# Patient Record
Sex: FEMALE | Race: WHITE | Employment: FULL TIME | URBAN - NONMETROPOLITAN AREA
[De-identification: names, ages, dates, MRNs, and addresses within clinical notes are randomized per-mention and may not be internally consistent; named-entity substitution may affect disease eponyms.]

---

## 2018-10-15 ENCOUNTER — OFFICE VISIT (OUTPATIENT)
Dept: FAMILY MEDICINE CLINIC | Age: 48
End: 2018-10-15
Payer: COMMERCIAL

## 2018-10-15 VITALS
HEIGHT: 69 IN | WEIGHT: 118.8 LBS | DIASTOLIC BLOOD PRESSURE: 52 MMHG | HEART RATE: 72 BPM | BODY MASS INDEX: 17.6 KG/M2 | SYSTOLIC BLOOD PRESSURE: 90 MMHG

## 2018-10-15 DIAGNOSIS — R63.4 WEIGHT LOSS: ICD-10-CM

## 2018-10-15 DIAGNOSIS — Z00.00 WELL ADULT HEALTH CHECK: ICD-10-CM

## 2018-10-15 DIAGNOSIS — Z12.4 SCREENING FOR CERVICAL CANCER: Primary | ICD-10-CM

## 2018-10-15 PROCEDURE — G8419 CALC BMI OUT NRM PARAM NOF/U: HCPCS | Performed by: FAMILY MEDICINE

## 2018-10-15 PROCEDURE — 1036F TOBACCO NON-USER: CPT | Performed by: FAMILY MEDICINE

## 2018-10-15 PROCEDURE — G8484 FLU IMMUNIZE NO ADMIN: HCPCS | Performed by: FAMILY MEDICINE

## 2018-10-15 PROCEDURE — 99213 OFFICE O/P EST LOW 20 MIN: CPT | Performed by: FAMILY MEDICINE

## 2018-10-15 PROCEDURE — G8427 DOCREV CUR MEDS BY ELIG CLIN: HCPCS | Performed by: FAMILY MEDICINE

## 2018-10-15 ASSESSMENT — PATIENT HEALTH QUESTIONNAIRE - PHQ9
2. FEELING DOWN, DEPRESSED OR HOPELESS: 0
SUM OF ALL RESPONSES TO PHQ QUESTIONS 1-9: 0
1. LITTLE INTEREST OR PLEASURE IN DOING THINGS: 0
SUM OF ALL RESPONSES TO PHQ9 QUESTIONS 1 & 2: 0
SUM OF ALL RESPONSES TO PHQ QUESTIONS 1-9: 0

## 2018-10-15 ASSESSMENT — ENCOUNTER SYMPTOMS
SHORTNESS OF BREATH: 0
GASTROINTESTINAL NEGATIVE: 1
ABDOMINAL PAIN: 0
BACK PAIN: 0
RESPIRATORY NEGATIVE: 1

## 2018-10-17 ENCOUNTER — TELEPHONE (OUTPATIENT)
Dept: FAMILY MEDICINE CLINIC | Age: 48
End: 2018-10-17

## 2018-10-17 ENCOUNTER — NURSE ONLY (OUTPATIENT)
Dept: FAMILY MEDICINE CLINIC | Age: 48
End: 2018-10-17
Payer: COMMERCIAL

## 2018-10-17 DIAGNOSIS — Z00.00 WELL ADULT HEALTH CHECK: ICD-10-CM

## 2018-10-17 DIAGNOSIS — R63.4 WEIGHT LOSS: ICD-10-CM

## 2018-10-17 DIAGNOSIS — E05.90 HYPERTHYROIDISM, SUBCLINICAL: Chronic | ICD-10-CM

## 2018-10-17 LAB
ALBUMIN SERPL-MCNC: 4.1 G/DL (ref 3.5–5.1)
ALP BLD-CCNC: 55 U/L (ref 38–126)
ALT SERPL-CCNC: 17 U/L (ref 11–66)
ANION GAP SERPL CALCULATED.3IONS-SCNC: 13 MEQ/L (ref 8–16)
AST SERPL-CCNC: 33 U/L (ref 5–40)
BASOPHILS # BLD: 1 %
BASOPHILS ABSOLUTE: 0 THOU/MM3 (ref 0–0.1)
BILIRUB SERPL-MCNC: 0.2 MG/DL (ref 0.3–1.2)
BUN BLDV-MCNC: 13 MG/DL (ref 7–22)
CALCIUM SERPL-MCNC: 9.5 MG/DL (ref 8.5–10.5)
CHLORIDE BLD-SCNC: 104 MEQ/L (ref 98–111)
CHOLESTEROL, TOTAL: 163 MG/DL (ref 100–199)
CO2: 26 MEQ/L (ref 23–33)
CREAT SERPL-MCNC: 0.8 MG/DL (ref 0.4–1.2)
CYTOLOGY THIN PREP PAP: NORMAL
EOSINOPHIL # BLD: 1.5 %
EOSINOPHILS ABSOLUTE: 0.1 THOU/MM3 (ref 0–0.4)
ERYTHROCYTE [DISTWIDTH] IN BLOOD BY AUTOMATED COUNT: 12.2 % (ref 11.5–14.5)
ERYTHROCYTE [DISTWIDTH] IN BLOOD BY AUTOMATED COUNT: 40.7 FL (ref 35–45)
GFR SERPL CREATININE-BSD FRML MDRD: 76 ML/MIN/1.73M2
GLUCOSE BLD-MCNC: 89 MG/DL (ref 70–108)
HCT VFR BLD CALC: 40.7 % (ref 37–47)
HDLC SERPL-MCNC: 44 MG/DL
HEMOGLOBIN: 13.3 GM/DL (ref 12–16)
IMMATURE GRANS (ABS): 0.01 THOU/MM3 (ref 0–0.07)
IMMATURE GRANULOCYTES: 0.3 %
LDL CHOLESTEROL CALCULATED: 103 MG/DL
LYMPHOCYTES # BLD: 32.9 %
LYMPHOCYTES ABSOLUTE: 1.3 THOU/MM3 (ref 1–4.8)
MCH RBC QN AUTO: 30 PG (ref 26–33)
MCHC RBC AUTO-ENTMCNC: 32.7 GM/DL (ref 32.2–35.5)
MCV RBC AUTO: 91.7 FL (ref 81–99)
MONOCYTES # BLD: 8.9 %
MONOCYTES ABSOLUTE: 0.4 THOU/MM3 (ref 0.4–1.3)
NUCLEATED RED BLOOD CELLS: 0 /100 WBC
PLATELET # BLD: 227 THOU/MM3 (ref 130–400)
PMV BLD AUTO: 9 FL (ref 9.4–12.4)
POTASSIUM SERPL-SCNC: 4.4 MEQ/L (ref 3.5–5.2)
RBC # BLD: 4.44 MILL/MM3 (ref 4.2–5.4)
SEG NEUTROPHILS: 55.4 %
SEGMENTED NEUTROPHILS ABSOLUTE COUNT: 2.2 THOU/MM3 (ref 1.8–7.7)
SODIUM BLD-SCNC: 143 MEQ/L (ref 135–145)
TOTAL PROTEIN: 7.4 G/DL (ref 6.1–8)
TRIGL SERPL-MCNC: 78 MG/DL (ref 0–199)
TSH SERPL DL<=0.05 MIU/L-ACNC: 0.89 UIU/ML (ref 0.4–4.2)
WBC # BLD: 4 THOU/MM3 (ref 4.8–10.8)

## 2018-10-17 PROCEDURE — 36415 COLL VENOUS BLD VENIPUNCTURE: CPT | Performed by: FAMILY MEDICINE

## 2018-11-30 ENCOUNTER — OFFICE VISIT (OUTPATIENT)
Dept: FAMILY MEDICINE CLINIC | Age: 48
End: 2018-11-30
Payer: COMMERCIAL

## 2018-11-30 VITALS
HEIGHT: 69 IN | HEART RATE: 80 BPM | TEMPERATURE: 98.3 F | WEIGHT: 116.6 LBS | DIASTOLIC BLOOD PRESSURE: 50 MMHG | SYSTOLIC BLOOD PRESSURE: 102 MMHG | BODY MASS INDEX: 17.27 KG/M2

## 2018-11-30 DIAGNOSIS — F41.1 GAD (GENERALIZED ANXIETY DISORDER): Primary | ICD-10-CM

## 2018-11-30 PROCEDURE — G8427 DOCREV CUR MEDS BY ELIG CLIN: HCPCS | Performed by: FAMILY MEDICINE

## 2018-11-30 PROCEDURE — 99213 OFFICE O/P EST LOW 20 MIN: CPT | Performed by: FAMILY MEDICINE

## 2018-11-30 PROCEDURE — G8484 FLU IMMUNIZE NO ADMIN: HCPCS | Performed by: FAMILY MEDICINE

## 2018-11-30 PROCEDURE — 1036F TOBACCO NON-USER: CPT | Performed by: FAMILY MEDICINE

## 2018-11-30 PROCEDURE — G8419 CALC BMI OUT NRM PARAM NOF/U: HCPCS | Performed by: FAMILY MEDICINE

## 2018-11-30 RX ORDER — ESCITALOPRAM OXALATE 10 MG/1
10 TABLET ORAL DAILY
Qty: 30 TABLET | Refills: 1 | Status: SHIPPED | OUTPATIENT
Start: 2018-11-30 | End: 2019-01-31 | Stop reason: SDUPTHER

## 2019-01-31 ENCOUNTER — OFFICE VISIT (OUTPATIENT)
Dept: FAMILY MEDICINE CLINIC | Age: 49
End: 2019-01-31
Payer: COMMERCIAL

## 2019-01-31 VITALS
HEIGHT: 69 IN | WEIGHT: 120.4 LBS | DIASTOLIC BLOOD PRESSURE: 60 MMHG | SYSTOLIC BLOOD PRESSURE: 116 MMHG | BODY MASS INDEX: 17.83 KG/M2 | HEART RATE: 84 BPM

## 2019-01-31 DIAGNOSIS — F41.1 GAD (GENERALIZED ANXIETY DISORDER): Primary | ICD-10-CM

## 2019-01-31 PROCEDURE — G8419 CALC BMI OUT NRM PARAM NOF/U: HCPCS | Performed by: FAMILY MEDICINE

## 2019-01-31 PROCEDURE — G8427 DOCREV CUR MEDS BY ELIG CLIN: HCPCS | Performed by: FAMILY MEDICINE

## 2019-01-31 PROCEDURE — 99213 OFFICE O/P EST LOW 20 MIN: CPT | Performed by: FAMILY MEDICINE

## 2019-01-31 PROCEDURE — G8484 FLU IMMUNIZE NO ADMIN: HCPCS | Performed by: FAMILY MEDICINE

## 2019-01-31 PROCEDURE — 1036F TOBACCO NON-USER: CPT | Performed by: FAMILY MEDICINE

## 2019-01-31 RX ORDER — ESCITALOPRAM OXALATE 20 MG/1
20 TABLET ORAL DAILY
Qty: 90 TABLET | Refills: 0 | Status: SHIPPED | OUTPATIENT
Start: 2019-01-31 | End: 2019-04-29 | Stop reason: SDUPTHER

## 2019-01-31 RX ORDER — ATENOLOL 25 MG/1
25 TABLET ORAL DAILY PRN
Qty: 30 TABLET | Refills: 2 | Status: SHIPPED | OUTPATIENT
Start: 2019-01-31 | End: 2019-01-31 | Stop reason: SDUPTHER

## 2019-01-31 RX ORDER — ESCITALOPRAM OXALATE 20 MG/1
20 TABLET ORAL DAILY
Qty: 90 TABLET | Refills: 0 | Status: SHIPPED | OUTPATIENT
Start: 2019-01-31 | End: 2019-01-31 | Stop reason: SDUPTHER

## 2019-01-31 RX ORDER — ATENOLOL 25 MG/1
25 TABLET ORAL DAILY PRN
Qty: 30 TABLET | Refills: 2 | Status: SHIPPED | OUTPATIENT
Start: 2019-01-31 | End: 2021-05-05 | Stop reason: SDUPTHER

## 2019-01-31 ASSESSMENT — PATIENT HEALTH QUESTIONNAIRE - PHQ9
SUM OF ALL RESPONSES TO PHQ QUESTIONS 1-9: 0
SUM OF ALL RESPONSES TO PHQ9 QUESTIONS 1 & 2: 0
SUM OF ALL RESPONSES TO PHQ QUESTIONS 1-9: 0
1. LITTLE INTEREST OR PLEASURE IN DOING THINGS: 0
2. FEELING DOWN, DEPRESSED OR HOPELESS: 0

## 2019-02-17 ENCOUNTER — NURSE TRIAGE (OUTPATIENT)
Dept: ADMINISTRATIVE | Age: 49
End: 2019-02-17

## 2019-04-29 ENCOUNTER — OFFICE VISIT (OUTPATIENT)
Dept: FAMILY MEDICINE CLINIC | Age: 49
End: 2019-04-29
Payer: COMMERCIAL

## 2019-04-29 VITALS
BODY MASS INDEX: 17.83 KG/M2 | DIASTOLIC BLOOD PRESSURE: 62 MMHG | SYSTOLIC BLOOD PRESSURE: 100 MMHG | HEIGHT: 69 IN | WEIGHT: 120.4 LBS | HEART RATE: 62 BPM

## 2019-04-29 DIAGNOSIS — E05.90 HYPERTHYROIDISM, SUBCLINICAL: ICD-10-CM

## 2019-04-29 DIAGNOSIS — F41.1 GAD (GENERALIZED ANXIETY DISORDER): Primary | ICD-10-CM

## 2019-04-29 PROCEDURE — 1036F TOBACCO NON-USER: CPT | Performed by: FAMILY MEDICINE

## 2019-04-29 PROCEDURE — 99213 OFFICE O/P EST LOW 20 MIN: CPT | Performed by: FAMILY MEDICINE

## 2019-04-29 PROCEDURE — G8419 CALC BMI OUT NRM PARAM NOF/U: HCPCS | Performed by: FAMILY MEDICINE

## 2019-04-29 PROCEDURE — 3017F COLORECTAL CA SCREEN DOC REV: CPT | Performed by: FAMILY MEDICINE

## 2019-04-29 PROCEDURE — G8427 DOCREV CUR MEDS BY ELIG CLIN: HCPCS | Performed by: FAMILY MEDICINE

## 2019-04-29 RX ORDER — ESCITALOPRAM OXALATE 20 MG/1
20 TABLET ORAL DAILY
Qty: 90 TABLET | Refills: 0 | Status: SHIPPED | OUTPATIENT
Start: 2019-04-29 | End: 2019-12-13

## 2019-04-29 ASSESSMENT — ENCOUNTER SYMPTOMS
ABDOMINAL PAIN: 0
RESPIRATORY NEGATIVE: 1
SHORTNESS OF BREATH: 0
GASTROINTESTINAL NEGATIVE: 1

## 2019-04-30 NOTE — PROGRESS NOTES
[facility] PROFESSIONAL [facility]  [address] [id number]  Dept: [phone]  Dept Fax: [phone]: [phone]    Visit Date: 4/29/2019    Geoff Gary is a 47 y. o.female who presents today for:   Chief Complaint   Patient presents with    3 Month Follow-Up     follow up on lexapro-makes tired    Hyperthyroidism         HPI:      She has settled on Lexapro for her anxiety and worry now for 5 months. Happy with the results. We discussed genetics, etc.   Daughter now having issues. She does report issues of fatigue and even afternoon 1/2 hour naps. Current Outpatient Medications   Medication Sig Dispense Refill    escitalopram (LEXAPRO) 20 MG tablet Take 1 tablet by mouth daily 90 tablet 0    atenolol (TENORMIN) 25 MG tablet Take 1 tablet by mouth daily as needed (heart) 30 tablet 2     No current facility-administered medications for this visit. The patient is allergic to neomycin. Subjective:      Review of Systems   Constitutional: Negative. Negative for activity change, appetite change and unexpected weight change. HENT: Negative. Respiratory: Negative. Negative for shortness of breath. Cardiovascular: Negative. Negative for chest pain. Gastrointestinal: Negative. Negative for abdominal pain. Genitourinary: Negative. Musculoskeletal: Negative. Skin: Negative. Neurological: Negative. Hematological: Negative. Psychiatric/Behavioral: Negative for dysphoric mood. The patient is nervous/anxious. Objective:     /62 (Site: Right Upper Arm, Position: Sitting, Cuff Size: Medium Adult)   Pulse 62   Ht 5' 9\" (1.753 m)   Wt 120 lb 6.4 oz (54.6 kg)   BMI 17.78 kg/m²     Physical Exam   Constitutional: She is oriented to person, place, and time. She appears well-developed and well-nourished. Neck: Normal range of motion. Neck supple. No thyromegaly present.    Cardiovascular: Normal rate, regular rhythm and normal heart sounds. Exam reveals no gallop and no friction rub. No murmur heard. Pulmonary/Chest: Effort normal and breath sounds normal.   Abdominal: Soft. She exhibits no mass. There is no splenomegaly or hepatomegaly. There is no tenderness. Musculoskeletal: She exhibits no edema or tenderness. Lymphadenopathy:     She has no cervical adenopathy. Neurological: She is alert and oriented to person, place, and time. Ambulatory. No tremors. Skin: Skin is warm and dry. No rash noted. Psychiatric: She has a normal mood and affect. Vitals reviewed. Assessment:       Diagnosis Orders   1. CHRISTINE (generalized anxiety disorder)     2. Hyperthyroidism, subclinical         Plan:    I want to try cutting Lexapro to 10 mg but will RX 20 mg tabs in case she wants to go back. Return in about 4 months (around 8/29/2019). Orders Placed:  No orders of the defined types were placed in this encounter.     Medications Prescribed:  Orders Placed This Encounter   Medications    escitalopram (LEXAPRO) 20 MG tablet     Sig: Take 1 tablet by mouth daily     Dispense:  90 tablet     Refill:  0         Electronically signed by Jean Paul Guaman 4/29/2019 at 8:12 PM

## 2019-05-11 LAB
ALBUMIN SERPL-MCNC: 4.6 G/DL
ALP BLD-CCNC: 57 U/L
ALT SERPL-CCNC: 11 U/L
ANION GAP SERPL CALCULATED.3IONS-SCNC: NORMAL MMOL/L
AST SERPL-CCNC: 20 U/L
BASOPHILS ABSOLUTE: NORMAL /ΜL
BASOPHILS RELATIVE PERCENT: 0.6 %
BILIRUB SERPL-MCNC: 0.7 MG/DL (ref 0.1–1.4)
BUN BLDV-MCNC: 16 MG/DL
CALCIUM SERPL-MCNC: 9.8 MG/DL
CHLORIDE BLD-SCNC: 103 MMOL/L
CHOLESTEROL, TOTAL: 166 MG/DL
CHOLESTEROL/HDL RATIO: NORMAL
CO2: 30 MMOL/L
CREAT SERPL-MCNC: 0.7 MG/DL
EOSINOPHILS ABSOLUTE: 0.1 /ΜL
EOSINOPHILS RELATIVE PERCENT: 2 %
GFR CALCULATED: 89
GLUCOSE BLD-MCNC: 69 MG/DL
HCT VFR BLD CALC: 38.2 % (ref 36–46)
HDLC SERPL-MCNC: 54 MG/DL (ref 35–70)
HEMOGLOBIN: 12.6 G/DL (ref 12–16)
LDL CHOLESTEROL CALCULATED: 93 MG/DL (ref 0–160)
LYMPHOCYTES ABSOLUTE: 1.5 /ΜL
LYMPHOCYTES RELATIVE PERCENT: 29.6 %
MCH RBC QN AUTO: 30.2 PG
MCHC RBC AUTO-ENTMCNC: 33.1 G/DL
MCV RBC AUTO: 91.3 FL
MONOCYTES ABSOLUTE: 0.3 /ΜL
MONOCYTES RELATIVE PERCENT: 6.9 %
NEUTROPHILS ABSOLUTE: 3 /ΜL
NEUTROPHILS RELATIVE PERCENT: 60.9 %
PDW BLD-RTO: 13.8 %
PHOSPHORUS: 2.8 MG/DL
PLATELET # BLD: 240 K/ΜL
PMV BLD AUTO: 7.1 FL
POTASSIUM SERPL-SCNC: 4.7 MMOL/L
RBC # BLD: 4.18 10^6/ΜL
SODIUM BLD-SCNC: 139 MMOL/L
TOTAL PROTEIN: 7
TRIGL SERPL-MCNC: 95 MG/DL
TSH SERPL DL<=0.05 MIU/L-ACNC: 0.41 UIU/ML
VLDLC SERPL CALC-MCNC: 19 MG/DL
WBC # BLD: 5 10^3/ML

## 2019-08-26 ENCOUNTER — OFFICE VISIT (OUTPATIENT)
Dept: FAMILY MEDICINE CLINIC | Age: 49
End: 2019-08-26
Payer: COMMERCIAL

## 2019-08-26 VITALS
HEART RATE: 78 BPM | SYSTOLIC BLOOD PRESSURE: 110 MMHG | HEIGHT: 69 IN | DIASTOLIC BLOOD PRESSURE: 60 MMHG | BODY MASS INDEX: 18.19 KG/M2 | WEIGHT: 122.8 LBS

## 2019-08-26 DIAGNOSIS — F41.1 GAD (GENERALIZED ANXIETY DISORDER): Primary | ICD-10-CM

## 2019-08-26 PROCEDURE — 1036F TOBACCO NON-USER: CPT | Performed by: FAMILY MEDICINE

## 2019-08-26 PROCEDURE — G8427 DOCREV CUR MEDS BY ELIG CLIN: HCPCS | Performed by: FAMILY MEDICINE

## 2019-08-26 PROCEDURE — 3017F COLORECTAL CA SCREEN DOC REV: CPT | Performed by: FAMILY MEDICINE

## 2019-08-26 PROCEDURE — G8419 CALC BMI OUT NRM PARAM NOF/U: HCPCS | Performed by: FAMILY MEDICINE

## 2019-08-26 PROCEDURE — 99213 OFFICE O/P EST LOW 20 MIN: CPT | Performed by: FAMILY MEDICINE

## 2019-12-13 ENCOUNTER — OFFICE VISIT (OUTPATIENT)
Dept: FAMILY MEDICINE CLINIC | Age: 49
End: 2019-12-13
Payer: COMMERCIAL

## 2019-12-13 VITALS
OXYGEN SATURATION: 99 % | HEART RATE: 64 BPM | DIASTOLIC BLOOD PRESSURE: 60 MMHG | HEIGHT: 69 IN | BODY MASS INDEX: 17.33 KG/M2 | WEIGHT: 117 LBS | SYSTOLIC BLOOD PRESSURE: 104 MMHG

## 2019-12-13 DIAGNOSIS — Z12.11 SPECIAL SCREENING FOR MALIGNANT NEOPLASMS, COLON: ICD-10-CM

## 2019-12-13 DIAGNOSIS — F41.1 GAD (GENERALIZED ANXIETY DISORDER): Primary | ICD-10-CM

## 2019-12-13 DIAGNOSIS — E05.90 HYPERTHYROIDISM, SUBCLINICAL: ICD-10-CM

## 2019-12-13 PROCEDURE — G8419 CALC BMI OUT NRM PARAM NOF/U: HCPCS | Performed by: FAMILY MEDICINE

## 2019-12-13 PROCEDURE — G8484 FLU IMMUNIZE NO ADMIN: HCPCS | Performed by: FAMILY MEDICINE

## 2019-12-13 PROCEDURE — 99213 OFFICE O/P EST LOW 20 MIN: CPT | Performed by: FAMILY MEDICINE

## 2019-12-13 PROCEDURE — G8427 DOCREV CUR MEDS BY ELIG CLIN: HCPCS | Performed by: FAMILY MEDICINE

## 2019-12-13 PROCEDURE — 3017F COLORECTAL CA SCREEN DOC REV: CPT | Performed by: FAMILY MEDICINE

## 2019-12-13 PROCEDURE — 1036F TOBACCO NON-USER: CPT | Performed by: FAMILY MEDICINE

## 2019-12-13 RX ORDER — ESCITALOPRAM OXALATE 10 MG/1
10 TABLET ORAL DAILY
Qty: 90 TABLET | Refills: 3 | Status: SHIPPED | OUTPATIENT
Start: 2019-12-13 | End: 2020-11-04 | Stop reason: SDUPTHER

## 2019-12-13 ASSESSMENT — ENCOUNTER SYMPTOMS
GASTROINTESTINAL NEGATIVE: 1
RESPIRATORY NEGATIVE: 1
SHORTNESS OF BREATH: 0
ABDOMINAL PAIN: 0

## 2020-02-07 ENCOUNTER — TELEPHONE (OUTPATIENT)
Dept: FAMILY MEDICINE CLINIC | Age: 50
End: 2020-02-07

## 2020-02-07 LAB
CONTROL: PRESENT
HEMOCCULT STL QL: NEGATIVE

## 2020-02-07 PROCEDURE — 82274 ASSAY TEST FOR BLOOD FECAL: CPT | Performed by: FAMILY MEDICINE

## 2020-03-18 ENCOUNTER — TELEPHONE (OUTPATIENT)
Dept: FAMILY MEDICINE CLINIC | Age: 50
End: 2020-03-18

## 2020-03-18 RX ORDER — VALACYCLOVIR HYDROCHLORIDE 1 G/1
1000 TABLET, FILM COATED ORAL 3 TIMES DAILY
Qty: 21 TABLET | Refills: 0 | Status: SHIPPED | OUTPATIENT
Start: 2020-03-18 | End: 2020-03-25

## 2020-08-05 ENCOUNTER — OFFICE VISIT (OUTPATIENT)
Dept: FAMILY MEDICINE CLINIC | Age: 50
End: 2020-08-05
Payer: COMMERCIAL

## 2020-08-05 VITALS
SYSTOLIC BLOOD PRESSURE: 118 MMHG | HEART RATE: 72 BPM | WEIGHT: 121.4 LBS | BODY MASS INDEX: 17.98 KG/M2 | HEIGHT: 69 IN | TEMPERATURE: 97.3 F | DIASTOLIC BLOOD PRESSURE: 70 MMHG

## 2020-08-05 PROBLEM — R41.840 CONCENTRATION DEFICIT: Status: ACTIVE | Noted: 2020-08-05

## 2020-08-05 PROBLEM — G47.8 UNREFRESHED BY SLEEP: Status: ACTIVE | Noted: 2020-08-05

## 2020-08-05 PROBLEM — R53.83 OTHER FATIGUE: Status: ACTIVE | Noted: 2020-08-05

## 2020-08-05 PROCEDURE — G8427 DOCREV CUR MEDS BY ELIG CLIN: HCPCS | Performed by: FAMILY MEDICINE

## 2020-08-05 PROCEDURE — G8419 CALC BMI OUT NRM PARAM NOF/U: HCPCS | Performed by: FAMILY MEDICINE

## 2020-08-05 PROCEDURE — 1036F TOBACCO NON-USER: CPT | Performed by: FAMILY MEDICINE

## 2020-08-05 PROCEDURE — 3017F COLORECTAL CA SCREEN DOC REV: CPT | Performed by: FAMILY MEDICINE

## 2020-08-05 PROCEDURE — 99214 OFFICE O/P EST MOD 30 MIN: CPT | Performed by: FAMILY MEDICINE

## 2020-08-05 ASSESSMENT — ENCOUNTER SYMPTOMS
VOMITING: 0
DIARRHEA: 0
ABDOMINAL PAIN: 1
COUGH: 0
SINUS PRESSURE: 0
ABDOMINAL DISTENTION: 1
CONSTIPATION: 1
NAUSEA: 0
SHORTNESS OF BREATH: 0
WHEEZING: 0

## 2020-08-05 ASSESSMENT — PATIENT HEALTH QUESTIONNAIRE - PHQ9
SUM OF ALL RESPONSES TO PHQ QUESTIONS 1-9: 0
SUM OF ALL RESPONSES TO PHQ QUESTIONS 1-9: 0
SUM OF ALL RESPONSES TO PHQ9 QUESTIONS 1 & 2: 0
2. FEELING DOWN, DEPRESSED OR HOPELESS: 0
1. LITTLE INTEREST OR PLEASURE IN DOING THINGS: 0

## 2020-08-05 NOTE — PROGRESS NOTES
[address] [place] [address] [address], [address], [place]  Phone:  [phone]  TDT:[phone]       Name: Joel Gomes  : 1970    Chief Complaint   Patient presents with    Fatigue     feels like she in a fog all day       HPI:     Joel Gomes is a 45 y.o. female who presents today for     HPI    H/o thyroid disease and depression   Thyroid has not needed further work up or treatment. Fog like feeling for a couple years then got better and now it is much worse. Will just stare at time in the middle of needing to concentrate. Can't remember what she is going to a room for. Bloating ongoing for months. Lack of energy up and down  [employer], many years. 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 Mother of 3, one , one senior, one graduated.  with Lymphoma, traveling to and from [facility] for treatment. Not too other caring issues    Diet -- not meat eater. Some beans. Some dairy. Nuts okay. 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 Nothing steady. Sleep --  7-8 hrs, not refreshed, wakes up in a couple of hours. Tolerating medications well   Atenolol taken as needed -- not a problem recently. Last time taken in winter. lexapro for anxiety and emotional, Dec. 2018. Feels more calm overall. Chronic worrier. Last period 2019 monthly. No late periods. No hot flashes. No night sweats.      Lab Results   Component Value Date    WBC 5.0 2019    HGB 12.6 2019    HCT 38.2 2019    MCV 91.3 2019     2019     Lab Results   Component Value Date    TSH 0.415 2019     No results found for: [id number]  Lab Results   Component Value Date     2019    K 4.7 2019     2019    CO2 30 2019    BUN 16 2019    CREATININE 0.7 2019    GLUCOSE 69 2019    CALCIUM 9.8 2019        Current Outpatient Medications:     escitalopram (LEXAPRO)

## 2020-08-05 NOTE — PATIENT INSTRUCTIONS
For bloating,  May add probiotic -- Activia or Chobani low sugar option. Or Kefir 1/2 cup + flax seed meal 1 Tbsp + 1/2 cup fruit (optional) -- daily for 1 week. If not getting good enough results with digestion, then double up on Kefir and flax seed meal.     Once you have obtained a couple of weeks of healthier digestive and intestinal results   then use 3-4 times a week. If having too many bowel movements a day, then may decrease amount or frequency further. After doing above for 4-6 weeks, and still experiencing bloating  -- may add collagen , organic, grass-fed, bovine   -- try for another 4-6 weeks and if not improvement may stop if desired. Luh Flores your records show you are due for the Shingrix vaccination. This is a vaccine to protect you from getting shingles. It is 90% plus protective. This is a great improvement over the previous vaccine. Many insurance plans are covering this vaccine. We do recommend you consider being vaccinated with this product. Shingrix is available through our office or your local pharmacy depending on your insurance coverage. We would like you to contact your insurance company to:   1. Check Coverage   2. Find out where you should receive this; our office or your local pharmacy. Thank you!

## 2020-08-11 ENCOUNTER — HOSPITAL ENCOUNTER (OUTPATIENT)
Age: 50
Discharge: HOME | End: 2020-08-11
Payer: COMMERCIAL

## 2020-08-11 DIAGNOSIS — R53.83 OTHER FATIGUE: ICD-10-CM

## 2020-08-11 DIAGNOSIS — E05.90 HYPERTHYROIDISM, SUBCLINICAL: Chronic | ICD-10-CM

## 2020-08-11 DIAGNOSIS — G47.8 UNREFRESHED BY SLEEP: ICD-10-CM

## 2020-08-11 DIAGNOSIS — R41.840 CONCENTRATION DEFICIT: ICD-10-CM

## 2020-08-11 LAB
ALBUMIN SERPL-MCNC: 4.3 G/DL (ref 3.5–5.1)
ALP BLD-CCNC: 57 U/L (ref 38–126)
ALT SERPL-CCNC: 10 U/L (ref 11–66)
ANION GAP SERPL CALCULATED.3IONS-SCNC: 10 MEQ/L (ref 8–16)
AST SERPL-CCNC: 23 U/L (ref 5–40)
BASOPHILS # BLD: 0.8 %
BASOPHILS ABSOLUTE: 0 THOU/MM3 (ref 0–0.1)
BILIRUB SERPL-MCNC: 0.6 MG/DL (ref 0.3–1.2)
BUN BLDV-MCNC: 8 MG/DL (ref 7–22)
CALCIUM SERPL-MCNC: 9.2 MG/DL (ref 8.5–10.5)
CHLORIDE BLD-SCNC: 104 MEQ/L (ref 98–111)
CHOLESTEROL, TOTAL: 188 MG/DL (ref 100–199)
CO2: 26 MEQ/L (ref 23–33)
CREAT SERPL-MCNC: 0.7 MG/DL (ref 0.4–1.2)
EOSINOPHIL # BLD: 2.1 %
EOSINOPHILS ABSOLUTE: 0.1 THOU/MM3 (ref 0–0.4)
ERYTHROCYTE [DISTWIDTH] IN BLOOD BY AUTOMATED COUNT: 12.6 % (ref 11.5–14.5)
ERYTHROCYTE [DISTWIDTH] IN BLOOD BY AUTOMATED COUNT: 44.9 FL (ref 35–45)
GFR SERPL CREATININE-BSD FRML MDRD: 88 ML/MIN/1.73M2
GLUCOSE BLD-MCNC: 85 MG/DL (ref 70–108)
HCT VFR BLD CALC: 38.5 % (ref 37–47)
HDLC SERPL-MCNC: 54 MG/DL
HEMOGLOBIN: 12.2 GM/DL (ref 12–16)
IMMATURE GRANS (ABS): 0.02 THOU/MM3 (ref 0–0.07)
IMMATURE GRANULOCYTES: 0.4 %
LDL CHOLESTEROL CALCULATED: 108 MG/DL
LYMPHOCYTES # BLD: 31.9 %
LYMPHOCYTES ABSOLUTE: 1.5 THOU/MM3 (ref 1–4.8)
MCH RBC QN AUTO: 30.5 PG (ref 26–33)
MCHC RBC AUTO-ENTMCNC: 31.7 GM/DL (ref 32.2–35.5)
MCV RBC AUTO: 96.3 FL (ref 81–99)
MONOCYTES # BLD: 8.2 %
MONOCYTES ABSOLUTE: 0.4 THOU/MM3 (ref 0.4–1.3)
NUCLEATED RED BLOOD CELLS: 0 /100 WBC
PLATELET # BLD: 206 THOU/MM3 (ref 130–400)
PMV BLD AUTO: 9.4 FL (ref 9.4–12.4)
POTASSIUM SERPL-SCNC: 4.3 MEQ/L (ref 3.5–5.2)
RBC # BLD: 4 MILL/MM3 (ref 4.2–5.4)
SEG NEUTROPHILS: 56.6 %
SEGMENTED NEUTROPHILS ABSOLUTE COUNT: 2.7 THOU/MM3 (ref 1.8–7.7)
SODIUM BLD-SCNC: 140 MEQ/L (ref 135–145)
TOTAL PROTEIN: 7.3 G/DL (ref 6.1–8)
TRIGL SERPL-MCNC: 132 MG/DL (ref 0–199)
TSH SERPL DL<=0.05 MIU/L-ACNC: 0.76 UIU/ML (ref 0.4–4.2)
VITAMIN B-12: 481 PG/ML (ref 211–911)
VITAMIN D 25-HYDROXY: 42 NG/ML (ref 30–100)
WBC # BLD: 4.8 THOU/MM3 (ref 4.8–10.8)

## 2020-08-11 PROCEDURE — 80061 LIPID PANEL: CPT

## 2020-08-11 PROCEDURE — 36415 COLL VENOUS BLD VENIPUNCTURE: CPT

## 2020-08-11 PROCEDURE — 82306 VITAMIN D 25 HYDROXY: CPT

## 2020-08-11 PROCEDURE — 82607 VITAMIN B-12: CPT

## 2020-08-11 PROCEDURE — 85025 COMPLETE CBC W/AUTO DIFF WBC: CPT

## 2020-08-11 PROCEDURE — 80053 COMPREHEN METABOLIC PANEL: CPT

## 2020-08-11 PROCEDURE — 84443 ASSAY THYROID STIM HORMONE: CPT

## 2020-08-14 ENCOUNTER — HOSPITAL ENCOUNTER (OUTPATIENT)
Dept: MAMMOGRAPHY | Age: 50
Discharge: HOME | End: 2020-08-14
Payer: COMMERCIAL

## 2020-08-14 PROCEDURE — 77063 BREAST TOMOSYNTHESIS BI: CPT

## 2020-08-18 ENCOUNTER — TELEPHONE (OUTPATIENT)
Dept: FAMILY MEDICINE CLINIC | Age: 50
End: 2020-08-18

## 2020-08-18 NOTE — TELEPHONE ENCOUNTER
----- Message from Faye Gomez MD sent at 8/17/2020  9:08 AM EDT -----  Please let patient know that the radiologist reading her mammogram is recommend one more view as there was a spot that was not pictured well. I have placed these orders but I am not sure if correct ones. please check with radiology to see if correct. I tried to reach them but they were busy. Thank you.

## 2020-08-21 ENCOUNTER — HOSPITAL ENCOUNTER (OUTPATIENT)
Dept: WOMENS IMAGING | Age: 50
Discharge: HOME | End: 2020-08-21
Payer: COMMERCIAL

## 2020-08-21 ENCOUNTER — HOSPITAL ENCOUNTER (OUTPATIENT)
Dept: WOMENS IMAGING | Age: 50
End: 2020-08-21
Payer: COMMERCIAL

## 2020-08-21 PROCEDURE — G0279 TOMOSYNTHESIS, MAMMO: HCPCS

## 2020-10-14 ENCOUNTER — OFFICE VISIT (OUTPATIENT)
Dept: FAMILY MEDICINE CLINIC | Age: 50
End: 2020-10-14
Payer: COMMERCIAL

## 2020-10-14 ENCOUNTER — HOSPITAL ENCOUNTER (OUTPATIENT)
Age: 50
Discharge: HOME | End: 2020-10-14
Payer: COMMERCIAL

## 2020-10-14 ENCOUNTER — HOSPITAL ENCOUNTER (OUTPATIENT)
Dept: GENERAL RADIOLOGY | Age: 50
Discharge: HOME | End: 2020-10-14
Payer: COMMERCIAL

## 2020-10-14 VITALS
SYSTOLIC BLOOD PRESSURE: 114 MMHG | HEART RATE: 72 BPM | DIASTOLIC BLOOD PRESSURE: 70 MMHG | HEIGHT: 69 IN | BODY MASS INDEX: 16.94 KG/M2 | TEMPERATURE: 97 F | WEIGHT: 114.4 LBS

## 2020-10-14 PROCEDURE — G8419 CALC BMI OUT NRM PARAM NOF/U: HCPCS | Performed by: FAMILY MEDICINE

## 2020-10-14 PROCEDURE — 1036F TOBACCO NON-USER: CPT | Performed by: FAMILY MEDICINE

## 2020-10-14 PROCEDURE — G8427 DOCREV CUR MEDS BY ELIG CLIN: HCPCS | Performed by: FAMILY MEDICINE

## 2020-10-14 PROCEDURE — 99213 OFFICE O/P EST LOW 20 MIN: CPT | Performed by: FAMILY MEDICINE

## 2020-10-14 PROCEDURE — G8484 FLU IMMUNIZE NO ADMIN: HCPCS | Performed by: FAMILY MEDICINE

## 2020-10-14 PROCEDURE — 73630 X-RAY EXAM OF FOOT: CPT

## 2020-10-14 PROCEDURE — 3017F COLORECTAL CA SCREEN DOC REV: CPT | Performed by: FAMILY MEDICINE

## 2020-10-14 ASSESSMENT — ENCOUNTER SYMPTOMS
BACK PAIN: 1
VOMITING: 0
NAUSEA: 0

## 2020-10-14 NOTE — PATIENT INSTRUCTIONS
Patient Education        Sesamoid Injury: Care Instructions  Your Care Instructions  Under the main joint of your big toe are two pea-sized bones called sesamoids (say \"WQS-vut-oiweo\"). They work with other bones, muscles, and tendons to help your toe and foot work correctly. Sometimes the force from walking, running, or jumping causes these bones to break. Or the tendons around these bones may get irritated and inflamed over time. When the tendons get inflamed, it's called sesamoiditis (say \"WYR-mfw-msl-DY-tis\"). A sesamoid injury is usually treated with proper shoes or with shoe inserts. Some people need to have their toe joint taped, or they need to wear a walking cast for a few weeks. The tape or cast keeps the joint from moving while it heals. Your doctor may give you a shot of steroid medicine in the foot to relieve pain and inflammation. Some people will need to wear a hard cast that is not removable. They'll need to stay off of their feet for a while. Some people need surgery to repair the bone. Follow-up care is a key part of your treatment and safety. Be sure to make and go to all appointments, and call your doctor if you are having problems. It's also a good idea to know your test results and keep a list of the medicines you take. How can you care for yourself at home? · Rest and protect your foot. Take a break from any activity that may cause pain. · Wear shoes that have low or flat heels and good arch supports. Don't wear tight, narrow, or high-heeled shoes. · Use moleskin or another type of cushion to pad the area. · If you have crutches, a cast, or special tape, follow your doctor's instructions for care and use. · Put ice or a cold pack around your toe for 10 to 20 minutes at a time as needed. Put a thin cloth between the ice and your skin. · Prop up your foot on a pillow when you ice your toe or anytime you sit or lie down. Try to keep it above the level of your heart.  This will help reduce swelling. · Ask your doctor if you can take an over-the-counter pain medicine, such as acetaminophen (Tylenol), ibuprofen (Advil, Motrin), or naproxen (Aleve). Be safe with medicines. Read and follow all instructions on the label. · Return to your usual activity slowly, as you feel better. When should you call for help? Watch closely for changes in your health, and be sure to contact your doctor if:    · You do not get better as expected. Where can you learn more? Go to https://[employer]appy shuttleeb.[employer]. org and sign in to your [employer] account. Enter J342 in the [employer] box to learn more about \"Sesamoid Injury: Care Instructions. \"     If you do not have an account, please click on the \"Sign Up Now\" link. Current as of: March 2, 2020               Content Version: 12.6  © 9450-9944 [employer]. Care instructions adapted under license by [facility] ([facility]). If you have questions about a medical condition or this instruction, always ask your healthcare professional. [address] any warranty or liability for your use of this information.

## 2020-10-14 NOTE — PROGRESS NOTES
10/14/2020     Israel Salmon (:  1970) is a 45 y.o. female, here for evaluation of the following medical concerns:    HPI  Foot pain - Patient complains of a cracking sensation that happens in her bilateral 1st MPJ, left is worse than right. Cracking noise has been going on for couple years, pain for the past year. Pain worsened in past week and a half. Patient has been exercising more in that time, but no traumatic history. Pain is throbbing/achy. Patient uses a gel metatarsal pad, unsure if it helps or not. No numbness tingling or burning. No other foot problems. Review of Systems   Constitutional: Negative for chills and fever. Cardiovascular: Negative for chest pain and leg swelling. Gastrointestinal: Negative for nausea and vomiting. Musculoskeletal: Positive for back pain (chronic - ongoing for long time). Negative for joint swelling. Prior to Visit Medications    Medication Sig Taking? Authorizing Provider   escitalopram (LEXAPRO) 10 MG tablet Take 1 tablet by mouth daily Yes Peace Machuca MD   atenolol (TENORMIN) 25 MG tablet Take 1 tablet by mouth daily as needed (heart) Yes Lilly Major MD        Social History     Tobacco Use    Smoking status: Never Smoker    Smokeless tobacco: Never Used   Substance Use Topics    Alcohol use: Yes        Vitals:    10/14/20 1505   BP: 114/70   Site: Left Upper Arm   Position: Sitting   Cuff Size: Medium Adult   Pulse: 72   Temp: 97 °F (36.1 °C)   TempSrc: Temporal   Weight: 114 lb 6.4 oz (51.9 kg)   Height: 5' 9\" (1.753 m)     Estimated body mass index is 16.89 kg/m² as calculated from the following:    Height as of this encounter: 5' 9\" (1.753 m). Weight as of this encounter: 114 lb 6.4 oz (51.9 kg). Physical Exam  Constitutional:       Appearance: Normal appearance. Cardiovascular:      Rate and Rhythm: Normal rate and regular rhythm.       Pulses:           Dorsalis pedis pulses are 2+ on the right side and 2+ on the left side.        Posterior tibial pulses are 2+ on the right side and 2+ on the left side. Pulmonary:      Effort: Pulmonary effort is normal.      Breath sounds: Normal breath sounds. Feet:      Comments: Left 1st MPJ pain with end dorsiflexory ROM, pain on palpation of left tibial sesamoid > fibular sesamoid. Normal and painless ROM for ankle, STJ, 1st ray b/l. Dermatologic: Skin quality is adequate, no open lesions, turgor normal bilaterally. Skin:     General: Skin is warm and dry. Capillary Refill: Capillary refill takes 2 to 3 seconds. Neurological:      Mental Status: She is alert. Psychiatric:         Mood and Affect: Mood normal.         Behavior: Behavior normal.         ASSESSMENT/PLAN:  1. Great toe pain, left  - Patient seen and evaluated  - Discussed that patient likely has either arthritis in left 1st MPJ or sesamoiditis  - Good supportive and rigid shoes recommended to reduce stress on 1st MPJ  - May use carbon fiber insert to make shoes more rigid if shoes are still too flexible  - Avoid shoes without support (heels, flats), only wear them at times when you will be mainly sitting or not walking  - Possibility for injection to 1st MPJ in future  - Last option of treatment is surgical correction, patient is not a surgical candidate at this point in time. - XR FOOT LEFT (MIN 3 VIEWS); Future      Return if symptoms worsen or fail to improve. An electronic signature was used to authenticate this note.     --Maurilio Mosher DPM on 10/14/2020 at 3:07 PM

## 2020-11-04 ENCOUNTER — OFFICE VISIT (OUTPATIENT)
Dept: FAMILY MEDICINE CLINIC | Age: 50
End: 2020-11-04
Payer: COMMERCIAL

## 2020-11-04 VITALS
HEIGHT: 69 IN | HEART RATE: 68 BPM | DIASTOLIC BLOOD PRESSURE: 68 MMHG | WEIGHT: 115.8 LBS | SYSTOLIC BLOOD PRESSURE: 110 MMHG | TEMPERATURE: 97.9 F | BODY MASS INDEX: 17.15 KG/M2

## 2020-11-04 PROCEDURE — 3017F COLORECTAL CA SCREEN DOC REV: CPT | Performed by: FAMILY MEDICINE

## 2020-11-04 PROCEDURE — G8427 DOCREV CUR MEDS BY ELIG CLIN: HCPCS | Performed by: FAMILY MEDICINE

## 2020-11-04 PROCEDURE — G8419 CALC BMI OUT NRM PARAM NOF/U: HCPCS | Performed by: FAMILY MEDICINE

## 2020-11-04 PROCEDURE — G8484 FLU IMMUNIZE NO ADMIN: HCPCS | Performed by: FAMILY MEDICINE

## 2020-11-04 PROCEDURE — 99214 OFFICE O/P EST MOD 30 MIN: CPT | Performed by: FAMILY MEDICINE

## 2020-11-04 PROCEDURE — 1036F TOBACCO NON-USER: CPT | Performed by: FAMILY MEDICINE

## 2020-11-04 RX ORDER — ESCITALOPRAM OXALATE 10 MG/1
10 TABLET ORAL DAILY
Qty: 90 TABLET | Refills: 3 | Status: SHIPPED | OUTPATIENT
Start: 2020-11-04 | End: 2021-05-05 | Stop reason: SDUPTHER

## 2020-11-04 RX ORDER — VITAMIN B COMPLEX
CAPSULE ORAL
Qty: 100 CAPSULE | Refills: 3 | COMMUNITY
Start: 2020-11-04

## 2020-11-04 NOTE — PROGRESS NOTES
[address] [place] [address] Rd, [address], [place]  Phone:  [phone]  YND:[phone]       Name: Julieth Tinsley  : 1970    Chief Complaint   Patient presents with    Check-Up     6 month check thyroid       HPI:     Julieth Tinsley is a 45 y.o. female who presents today for     HPI    Doing okay in managing highly stressful time for her family and work. Still having some fatigue. She did not see the lab notes sent via gBoxt. We review these in detail here. She has been eating healthy, started doing more exercise and lost weight. She has trouble maintaining good weight in the first place. Current Outpatient Medications:     escitalopram (LEXAPRO) 10 MG tablet, Take 1 tablet by mouth daily, Disp: 90 tablet, Rfl: 3    B Complex-Biotin-FA (SUPER B-50 COMPLEX) CAPS, 1 tablet daily, Disp: 100 capsule, Rfl: 3    atenolol (TENORMIN) 25 MG tablet, Take 1 tablet by mouth daily as needed (heart), Disp: 30 tablet, Rfl: 2    Allergies   Allergen Reactions    Neomycin Swelling     Neomycin eye drops caused eyelids to swell       Review of Systems   Constitutional: Negative for fatigue and fever. Eyes: Negative for visual disturbance. Respiratory: Negative for shortness of breath. Cardiovascular: Negative for chest pain and leg swelling. Gastrointestinal: Negative for diarrhea and nausea. Psychiatric/Behavioral: Positive for decreased concentration (at times). The patient is nervous/anxious (at times). Objective:     /68 (Site: Left Upper Arm, Position: Sitting, Cuff Size: Medium Adult)   Pulse 68   Temp 97.9 °F (36.6 °C) (Temporal)   Ht 5' 9\" (1.753 m)   Wt 115 lb 12.8 oz (52.5 kg)   LMP 10/19/2020   BMI 17.10 kg/m²     Physical Exam  Constitutional:       General: She is not in acute distress. Appearance: Normal appearance. She is not ill-appearing. HENT:      Head: Normocephalic.    Eyes:      Conjunctiva/sclera: Conjunctivae normal. Cardiovascular:      Rate and Rhythm: Normal rate and regular rhythm. Heart sounds: No murmur. Pulmonary:      Effort: Pulmonary effort is normal. No respiratory distress. Breath sounds: Normal breath sounds. No wheezing. Musculoskeletal:         General: No swelling. Neurological:      Mental Status: She is alert and oriented to person, place, and time. Psychiatric:         Mood and Affect: Mood normal.         Behavior: Behavior normal.         Thought Content: Thought content normal.         Judgment: Judgment normal.       Wt Readings from Last 3 Encounters:   11/04/20 115 lb 12.8 oz (52.5 kg)   10/14/20 114 lb 6.4 oz (51.9 kg)   08/05/20 121 lb 6.4 oz (55.1 kg)     No visits with results within 1 Month(s) from this visit. Latest known visit with results is:   Hospital Outpatient Visit on 08/11/2020   Component Date Value Ref Range Status    Vit D, 25-Hydroxy 08/11/2020 42  30 - 100 ng/ml Final    Comment: Vitamin D Status           Range    Deficiency                 <20 ng/ml  Insuffiency                20-30 ng/ml  Sufficiency                 ng/ml  Toxicity                   >100 ng/ml  Performed at [address] [place] [address], [address]      Vitamin B-12 08/11/2020 481  211 - 911 pg/mL Final    Performed at [address] [place] [address], [address]    TSH 08/11/2020 0.764  0.400 - 4.20 uIU/mL Final    Performed at [address] [place] [address], [address]    Cholesterol, Total 08/11/2020 188  100 - 199 mg/dL Final    Comment:      <200          Desirable       200 - 239     Borderline High       >239          High      Triglycerides 08/11/2020 132  0 - 199 mg/dL Final    Comment:      <150          Desirable       150 - 199     Borderline High       200 - 499     High       >449          Very High       Ranges are based upon NCEP/ATP III guidelines.       HDL 08/11/2020 54  mg/dL Final    Comment:      Refer to General Chemistry for CHOL and TRIG results. HDL CLASSIFICATIONS FOR PATIENTS > 21YEARS OLD. <40               Undesirable (Major Risk Factor)       >60               Protective (Negative Risk Factor)      LDL Calculated 08/11/2020 108  mg/dL Final    Comment:      Refer to General Chemistry for CHOL and TRIG results.        LDL CLASSIFICATIONS FOR PATIENTS >21YEARS OLD:          ** Determination Invalid if TRIG >400 **       <100          Optimal       100 - 129     Near or Above Optimal       130 - 159     Borderline High       160 - 189     High Risk       >189          Very High Risk  Performed at [address], [address]      Glucose 08/11/2020 85  70 - 108 mg/dL Final    CREATININE 08/11/2020 0.7  0.4 - 1.2 mg/dL Final    BUN 08/11/2020 8  7 - 22 mg/dL Final    Sodium 08/11/2020 140  135 - 145 meq/L Final    Potassium 08/11/2020 4.3  3.5 - 5.2 meq/L Final    Chloride 08/11/2020 104  98 - 111 meq/L Final    CO2 08/11/2020 26  23 - 33 meq/L Final    Calcium 08/11/2020 9.2  8.5 - 10.5 mg/dL Final    AST 08/11/2020 23  5 - 40 U/L Final    Alkaline Phosphatase 08/11/2020 57  38 - 126 U/L Final    Total Protein 08/11/2020 7.3  6.1 - 8.0 g/dL Final    Alb 08/11/2020 4.3  3.5 - 5.1 g/dL Final    Total Bilirubin 08/11/2020 0.6  0.3 - 1.2 mg/dL Final    ALT 08/11/2020 10* 11 - 66 U/L Final    Performed at [address] [place] [id number]    WBC 08/11/2020 4.8  4.8 - 10.8 thou/mm3 Final    RBC 08/11/2020 4.00* 4.20 - 5.40 mill/mm3 Final    Hemoglobin 08/11/2020 12.2  12.0 - 16.0 gm/dl Final    Hematocrit 08/11/2020 38.5  37.0 - 47.0 % Final    MCV 08/11/2020 96.3  81.0 - 99.0 fL Final    MCH 08/11/2020 30.5  26.0 - 33.0 pg Final    MCHC 08/11/2020 31.7* 32.2 - 35.5 gm/dl Final    RDW-CV 08/11/2020 12.6  11.5 - 14.5 % Final    RDW-SD 08/11/2020 44.9  35.0 - 45.0 fL Final    Platelets 27/74/7803 206  130 - 400 thou/mm3 Final    MPV 08/11/2020 9.4  9.4 - 12.4 fL Final    Seg Neutrophils 08/11/2020 56.6  % Final    Lymphocytes 08/11/2020 31.9  % Final    Monocytes 08/11/2020 8.2  % Final    Eosinophils 08/11/2020 2.1  % Final    Basophils 08/11/2020 0.8  % Final    Immature Granulocytes 08/11/2020 0.4  % Final    Segs Absolute 08/11/2020 2.7  1.8 - 7.7 thou/mm3 Final    Lymphocytes Absolute 08/11/2020 1.5  1.0 - 4.8 thou/mm3 Final    Monocytes Absolute 08/11/2020 0.4  0.4 - 1.3 thou/mm3 Final    Eosinophils Absolute 08/11/2020 0.1  0.0 - 0.4 thou/mm3 Final    Basophils Absolute 08/11/2020 0.0  0.0 - 0.1 thou/mm3 Final    Immature Grans (Abs) 08/11/2020 0.02  0.00 - 0.07 thou/mm3 Final    nRBC 08/11/2020 0  /100 wbc Final    Performed at [address] [place] [address], [address]    Anion Gap 08/11/2020 10.0  8.0 - 16.0 meq/L Final    Comment: ANION GAP = Sodium -(Chloride + CO2)  Performed at [address] [place] [address], [address]      Est, Glom Filt Rate 08/11/2020 88* ml/min/1.73m2 Final    Comment: Stage Description                    GFR, ml/min/1.73 m2   -   At increased risk               > or = 60 (with chronic                                       kidney disease risk factors)   1   Normal or increased GFR         > or = 90   2   Mildly or decreased GFR         60 - 89   3   Moderately decreased GFR        30 - 59   4   Severely decreased GFR          15 - 29   5   Kidney failure                  <15 (or dialysis)  Estimated GFR calculated using abbreviated MDRD formula as  recommended by [employer]. Calculation based  upon serum creatinine and adjusted for age, gender & race. Susanna. Internal Med., Vol. 139 (2) pg 137-147. Performed at [place], [address]        Assessment/Plan:     Josee Gudino was seen today for check-up. Diagnoses and all orders for this visit:    Situational stress  -     escitalopram (LEXAPRO) 10 MG tablet;  Take 1 tablet by mouth daily    Hyperthyroidism, subclinical    Other fatigue  -     B Complex-Biotin-FA (SUPER B-50 COMPLEX) CAPS; 1 tablet daily    Recent weight loss    continue lexapro. Just got her flu shot -- wanted Shingrix. She will wait for 4 weeks. For weight loss, enhanced nutrition discussed. Will add vitamin B complex with current stress. Continue exercise as helpful. Encouraged meditation with focus on something of her choice -- ideas given. Gentle deep breathing for brief pauses in her very intense day. Patient Instructions   Start a Vitamin B super complex daily     Add 5 -10 more grams of protein (eggs, tuna, nuts, seeds)  Add another cup of fruit  Also healthy fats -- olive oil, avocado    Wait 4 weeks before getting Shingrix started         Return in about 6 months (around 5/4/2021). Future Appointments   Date Time Provider Sherry Kenisha   5/5/2021  4:00 PM Rosa Garcia MD [facility] [address]          This office note has been dictated. Effort was made to review for errors but some may have been missed. Please contact Gil Washburn of note for clarification if needed.        Electronically signed by Rosa Garcia MD on 11/7/2020 at 6:57 PM

## 2020-11-07 ASSESSMENT — ENCOUNTER SYMPTOMS
SHORTNESS OF BREATH: 0
NAUSEA: 0
DIARRHEA: 0

## 2021-05-05 ENCOUNTER — OFFICE VISIT (OUTPATIENT)
Dept: FAMILY MEDICINE CLINIC | Age: 51
End: 2021-05-05
Payer: COMMERCIAL

## 2021-05-05 VITALS
TEMPERATURE: 97.3 F | OXYGEN SATURATION: 98 % | WEIGHT: 108 LBS | SYSTOLIC BLOOD PRESSURE: 120 MMHG | DIASTOLIC BLOOD PRESSURE: 74 MMHG | BODY MASS INDEX: 15.95 KG/M2 | HEART RATE: 68 BPM

## 2021-05-05 DIAGNOSIS — R00.2 PALPITATIONS: ICD-10-CM

## 2021-05-05 DIAGNOSIS — R63.4 WEIGHT LOSS: Primary | ICD-10-CM

## 2021-05-05 DIAGNOSIS — F43.9 SITUATIONAL STRESS: ICD-10-CM

## 2021-05-05 DIAGNOSIS — E05.90 HYPERTHYROIDISM, SUBCLINICAL: Chronic | ICD-10-CM

## 2021-05-05 PROCEDURE — 1036F TOBACCO NON-USER: CPT | Performed by: FAMILY MEDICINE

## 2021-05-05 PROCEDURE — 3017F COLORECTAL CA SCREEN DOC REV: CPT | Performed by: FAMILY MEDICINE

## 2021-05-05 PROCEDURE — 99213 OFFICE O/P EST LOW 20 MIN: CPT | Performed by: FAMILY MEDICINE

## 2021-05-05 PROCEDURE — G8427 DOCREV CUR MEDS BY ELIG CLIN: HCPCS | Performed by: FAMILY MEDICINE

## 2021-05-05 PROCEDURE — G8419 CALC BMI OUT NRM PARAM NOF/U: HCPCS | Performed by: FAMILY MEDICINE

## 2021-05-05 RX ORDER — ATENOLOL 25 MG/1
25 TABLET ORAL DAILY PRN
Qty: 30 TABLET | Refills: 2 | Status: SHIPPED | OUTPATIENT
Start: 2021-05-05 | End: 2022-04-29 | Stop reason: SDUPTHER

## 2021-05-05 RX ORDER — ESCITALOPRAM OXALATE 10 MG/1
10 TABLET ORAL DAILY
Qty: 90 TABLET | Refills: 3 | Status: SHIPPED | OUTPATIENT
Start: 2021-05-05 | End: 2022-04-29 | Stop reason: SDUPTHER

## 2021-05-05 ASSESSMENT — PATIENT HEALTH QUESTIONNAIRE - PHQ9
1. LITTLE INTEREST OR PLEASURE IN DOING THINGS: 0
SUM OF ALL RESPONSES TO PHQ9 QUESTIONS 1 & 2: 0
SUM OF ALL RESPONSES TO PHQ QUESTIONS 1-9: 0
SUM OF ALL RESPONSES TO PHQ QUESTIONS 1-9: 0

## 2021-05-05 NOTE — PROGRESS NOTES
[address] [place] [address] [address], [address], [place]  Phone:  [phone]  U:[phone]       Name: Allen Pagan  : 1970    Chief Complaint   Patient presents with    6 Month Follow-Up    Anxiety    Hyperthyroidism       HPI:     Allen Pagan is a 45 y.o. female who presents today for     HPI    Patient following for anxiety. Grieving loss of dad. Lost weight but trying to eat healthily. 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 Also still tensions with her . He states that she continued an affair with a friend. Takes lexapro which has been helping. Through counseling here, it appears she has emotional needs that have not been met for a long time. Communication troubles are evident.  has been battling lymphoma and also covid19. Lighter periods, regular. Has history of palpitations but those have been doing well. She stays physically active. She does not take Tenormin every day and has not had to take it for a long time. She needs refills however as script . She has a history of hypothyroidism and had been stable. There is concerned about a flareup with her weight loss. Immunization History   Administered Date(s) Administered    COVID-19, Moderna, PF, 100mcg/0.5mL 2021, 2021    Influenza Vaccine, unspecified formulation 10/26/2016    Influenza Virus Vaccine 10/31/2017, 10/10/2018       Current Outpatient Medications:     atenolol (TENORMIN) 25 MG tablet, Take 1 tablet by mouth daily as needed (heart), Disp: 30 tablet, Rfl: 2    escitalopram (LEXAPRO) 10 MG tablet, Take 1 tablet by mouth daily, Disp: 90 tablet, Rfl: 3    B Complex-Biotin-FA (SUPER B-50 COMPLEX) CAPS, 1 tablet daily, Disp: 100 capsule, Rfl: 3    Allergies   Allergen Reactions    Neomycin Swelling     Neomycin eye drops caused eyelids to swell       Review of Systems  No fever/chills. Tired some. Stressed grieving. Objective:     /74 (Site: Right Upper Arm, Position: Sitting, Cuff Size: Large Adult)   Pulse 68   Temp 97.3 °F (36.3 °C) (Temporal)   Wt 108 lb (49 kg)   SpO2 98%   BMI 15.95 kg/m²   Wt Readings from Last 3 Encounters:   05/05/21 108 lb (49 kg)   11/04/20 115 lb 12.8 oz (52.5 kg)   10/14/20 114 lb 6.4 oz (51.9 kg)     Lab Results   Component Value Date    TSH 0.764 08/11/2020     Physical Exam  Constitutional:       General: She is not in acute distress. Appearance: Normal appearance. She is not ill-appearing. Cardiovascular:      Rate and Rhythm: Normal rate and regular rhythm. Heart sounds: No murmur. Pulmonary:      Effort: Pulmonary effort is normal. No respiratory distress. Breath sounds: Normal breath sounds. No wheezing. Musculoskeletal:         General: No swelling. Neurological:      Mental Status: She is alert. Psychiatric:         Mood and Affect: Mood normal.      Comments: Avoids eye contact when  talking about their struggle. Lab Results   Component Value Date    WBC 4.8 08/11/2020    HGB 12.2 08/11/2020    HCT 38.5 08/11/2020    MCV 96.3 08/11/2020     08/11/2020       Assessment/Plan:     Mariah Guo was seen today for 6 month follow-up, anxiety and hyperthyroidism. Diagnoses and all orders for this visit:    Weight loss  -     TSH With Reflex Ft4; Future    Situational stress  -     escitalopram (LEXAPRO) 10 MG tablet; Take 1 tablet by mouth daily    Hyperthyroidism, subclinical  -     TSH With Reflex Ft4; Future    Palpitations  -     atenolol (TENORMIN) 25 MG tablet; Take 1 tablet by mouth daily as needed (heart)    Complex situation with regards to her grieving and current other stressors. They agree to marriage counseling. Communication troubles are significant. She feels Lexapro 10 mg will be sufficient for now. She is aware that there is a 20 mg dose if needed. Return in about 6 months (around 11/5/2021).     Future Appointments Date Time Provider Sherry De   11/5/2021  1:00 PM Katie Sullivan MD [facility] [address]          This office note has been dictated. Effort was made to review for errors but some may have been missed. Please contact Howard Gr of note for clarification if needed.        Electronically signed by Katie Sullivan MD on 5/10/2021 at 12:40 PM

## 2021-05-10 ENCOUNTER — HOSPITAL ENCOUNTER (OUTPATIENT)
Age: 51
Discharge: HOME | End: 2021-05-10
Payer: COMMERCIAL

## 2021-05-10 ENCOUNTER — TELEPHONE (OUTPATIENT)
Dept: FAMILY MEDICINE CLINIC | Age: 51
End: 2021-05-10

## 2021-05-10 DIAGNOSIS — R00.2 PALPITATIONS: ICD-10-CM

## 2021-05-10 DIAGNOSIS — E05.90 HYPERTHYROIDISM, SUBCLINICAL: Chronic | ICD-10-CM

## 2021-05-10 DIAGNOSIS — R63.4 WEIGHT LOSS: ICD-10-CM

## 2021-05-10 PROCEDURE — 84439 ASSAY OF FREE THYROXINE: CPT

## 2021-05-10 PROCEDURE — 84443 ASSAY THYROID STIM HORMONE: CPT

## 2021-05-10 PROCEDURE — 36415 COLL VENOUS BLD VENIPUNCTURE: CPT

## 2021-05-10 RX ORDER — ATENOLOL 25 MG/1
25 TABLET ORAL DAILY PRN
Qty: 30 TABLET | Refills: 2 | Status: CANCELLED | OUTPATIENT
Start: 2021-05-10

## 2021-05-10 NOTE — TELEPHONE ENCOUNTER
Received a request today from [facility] but the script for atenolol is already filled on 05/0521

## 2021-05-10 NOTE — PROGRESS NOTES
Please let patient know that I looked into options in the area for marriage counseling.  came with patient and both agreed to counseling. It is our understanding that insurance only covers private one-on-one sessions and not marriage counseling when both need to be present. However we believe that the following group's services would be helpful to her and   (same note placed in his  chart)    [facility] recommendation [facility]     Phone #[phone]

## 2021-05-11 DIAGNOSIS — R63.4 WEIGHT LOSS: Primary | ICD-10-CM

## 2021-05-11 DIAGNOSIS — E05.90 HYPERTHYROIDISM, SUBCLINICAL: Chronic | ICD-10-CM

## 2021-05-11 LAB
T4 FREE: 1.57 NG/DL (ref 0.93–1.76)
TSH SERPL DL<=0.05 MIU/L-ACNC: 0.28 UIU/ML (ref 0.4–4.2)

## 2021-08-06 ENCOUNTER — NURSE ONLY (OUTPATIENT)
Dept: FAMILY MEDICINE CLINIC | Age: 51
End: 2021-08-06
Payer: COMMERCIAL

## 2021-08-06 DIAGNOSIS — Z23 NEED FOR SHINGLES VACCINE: Primary | ICD-10-CM

## 2021-08-06 PROCEDURE — 90750 HZV VACC RECOMBINANT IM: CPT | Performed by: FAMILY MEDICINE

## 2021-08-06 PROCEDURE — 90471 IMMUNIZATION ADMIN: CPT | Performed by: FAMILY MEDICINE

## 2021-11-09 ENCOUNTER — TELEPHONE (OUTPATIENT)
Dept: FAMILY MEDICINE CLINIC | Age: 51
End: 2021-11-09

## 2021-11-09 ENCOUNTER — HOSPITAL ENCOUNTER (OUTPATIENT)
Age: 51
Discharge: HOME | End: 2021-11-09
Payer: COMMERCIAL

## 2021-11-09 DIAGNOSIS — E05.90 HYPERTHYROIDISM, SUBCLINICAL: Chronic | ICD-10-CM

## 2021-11-09 DIAGNOSIS — R63.4 WEIGHT LOSS: ICD-10-CM

## 2021-11-09 LAB — TSH SERPL DL<=0.05 MIU/L-ACNC: 0.6 UIU/ML (ref 0.4–4.2)

## 2021-11-09 PROCEDURE — 84443 ASSAY THYROID STIM HORMONE: CPT

## 2021-11-09 PROCEDURE — 36415 COLL VENOUS BLD VENIPUNCTURE: CPT

## 2021-11-09 NOTE — TELEPHONE ENCOUNTER
Radha Pope called to [facility] [employer], said [facility] did not have atomoxetine refill. I called over to [facility] and they had the script and are filling it now for her to .

## 2021-11-12 ENCOUNTER — OFFICE VISIT (OUTPATIENT)
Dept: FAMILY MEDICINE CLINIC | Age: 51
End: 2021-11-12
Payer: COMMERCIAL

## 2021-11-12 VITALS
WEIGHT: 115 LBS | DIASTOLIC BLOOD PRESSURE: 70 MMHG | BODY MASS INDEX: 17.03 KG/M2 | HEIGHT: 69 IN | OXYGEN SATURATION: 99 % | TEMPERATURE: 97.7 F | SYSTOLIC BLOOD PRESSURE: 132 MMHG | HEART RATE: 80 BPM

## 2021-11-12 DIAGNOSIS — Z23 NEED FOR VACCINATION: ICD-10-CM

## 2021-11-12 DIAGNOSIS — Z12.11 SCREEN FOR COLON CANCER: ICD-10-CM

## 2021-11-12 DIAGNOSIS — E05.90 HYPERTHYROIDISM, SUBCLINICAL: Chronic | ICD-10-CM

## 2021-11-12 DIAGNOSIS — F98.8 ATTENTION DEFICIT DISORDER (ADD) WITHOUT HYPERACTIVITY: Primary | ICD-10-CM

## 2021-11-12 PROCEDURE — G8419 CALC BMI OUT NRM PARAM NOF/U: HCPCS | Performed by: FAMILY MEDICINE

## 2021-11-12 PROCEDURE — G8484 FLU IMMUNIZE NO ADMIN: HCPCS | Performed by: FAMILY MEDICINE

## 2021-11-12 PROCEDURE — 90750 HZV VACC RECOMBINANT IM: CPT | Performed by: FAMILY MEDICINE

## 2021-11-12 PROCEDURE — G8427 DOCREV CUR MEDS BY ELIG CLIN: HCPCS | Performed by: FAMILY MEDICINE

## 2021-11-12 PROCEDURE — 99213 OFFICE O/P EST LOW 20 MIN: CPT | Performed by: FAMILY MEDICINE

## 2021-11-12 PROCEDURE — 3017F COLORECTAL CA SCREEN DOC REV: CPT | Performed by: FAMILY MEDICINE

## 2021-11-12 PROCEDURE — 1036F TOBACCO NON-USER: CPT | Performed by: FAMILY MEDICINE

## 2021-11-12 PROCEDURE — 90471 IMMUNIZATION ADMIN: CPT | Performed by: FAMILY MEDICINE

## 2021-11-12 SDOH — ECONOMIC STABILITY: FOOD INSECURITY: WITHIN THE PAST 12 MONTHS, YOU WORRIED THAT YOUR FOOD WOULD RUN OUT BEFORE YOU GOT MONEY TO BUY MORE.: NEVER TRUE

## 2021-11-12 SDOH — ECONOMIC STABILITY: FOOD INSECURITY: WITHIN THE PAST 12 MONTHS, THE FOOD YOU BOUGHT JUST DIDN'T LAST AND YOU DIDN'T HAVE MONEY TO GET MORE.: NEVER TRUE

## 2021-11-12 ASSESSMENT — SOCIAL DETERMINANTS OF HEALTH (SDOH): HOW HARD IS IT FOR YOU TO PAY FOR THE VERY BASICS LIKE FOOD, HOUSING, MEDICAL CARE, AND HEATING?: NOT HARD AT ALL

## 2021-11-12 NOTE — PROGRESS NOTES
Kevin Malcolm (:  1970) is a 45 y.o. female,Established patient, here for evaluation of the following chief complaint(s):  6 Month Follow-Up (last period has lasted 2 weeks but not heavy) and Hypothyroidism       ASSESSMENT/PLAN:  1. Attention deficit disorder (ADD) without hyperactivity  2. Hyperthyroidism, subclinical  3. Need for vaccination  -     Zoster recombinant [facility])  4. Screen for colon cancer  -     POCT Fecal Immunochemical Test (FIT); Future    Patient will wait for her Strattera 25 mg daily and use that for couple weeks then go up to the 40. She is aware that higher doses are available but that we need to give the medication a good month to see how it is working for her. She will continue counseling. Currently thyroid issues appear to be stable. Return in about 6 months (around 2022). Subjective   SUBJECTIVE/OBJECTIVE:  HPI     ADD recently diagnosed after several rounds of counseling where behaviors were being picked up. Evaluation is on chart. Patient has struggled with some symptoms of concentration deficit, hyperactive mind and behaviors of impulse control throughout her lifetime. Due to her subclinical hypothyroidism, sleep troubles chronically, and lack of hunger,  we decided not to use stimulants. Most recent TSH is good. Patient has been under a lot of stress which may have affected her thyroid earlier. Currently she has no tremors. Her weight is slightly improved. She still has a BMI of 16-17. She has been small most of her life.  has been dealing with lymphoma and prolonged Covid symptoms and also his own anxiety issues. Wt Readings from Last 3 Encounters:   21 115 lb (52.2 kg)   21 108 lb (49 kg)   20 115 lb 12.8 oz (52.5 kg)     Review of Systems   Constitutional: Negative for fatigue and fever. Respiratory: Negative for shortness of breath. Cardiovascular: Negative for chest pain and leg swelling. Objective   Physical Exam    Gen: NAD, AAO x 3, coherent, pleasant     Psych: normal behavior and mood    Lab Results   Component Value Date    TSH 0.597 11/09/2021         An electronic signature was used to authenticate this note.     --Minerva Segovia MD

## 2021-11-12 NOTE — PROGRESS NOTES
[facility]  1970    1. Are you sick today? no  2. Do you have allergies to medications, food, a vaccine component, or latex? no  3. Have you ever had a serious reaction after receiving a vaccination? no  4. Do you have a long-term health problem with heart, lung, kidney, or metabolic disease (e.g. diabetes), asthma, a blood disorder, no spleen, complement component deficiency, a cochlear implant, or a spinal fluid leak? Are you on long-term aspirin therapy? no  5. Do you have cancer, leukemia, HIV/AIDS, or any other immune system problem? no  6. Do you have a parent, brother, or sister with an immune system problem? no  7. In the past 3 months, have you taken medications that affect your immune system, such as prednisone, other steroids, or anticancer drugs; drugs for the treatment of rheumatoid arthritis, Crohn's disease, or psoriasis; or have you had radiation treatment? no  8. Have you had a seizure or a brain or other nervous system problem? no  9. During the past year, have you received a transfusion of blood or blood products, or been given immune (gamma) globulin or an antiviral drug? no  10. For women: Are you pregnant or is there a chance you could become pregnant during the next month? no  11. Have you received any vaccinations in the past 4 weeks? no    Form Completed by: Jarek Griffith  on 11/12/2021 at 12:31 PM EST  Form Reviewed by: Yasir Keating on 11/12/2021 at 12:31 PM EST    Did you bring your immunization card with you?  No

## 2021-11-20 ASSESSMENT — ENCOUNTER SYMPTOMS: SHORTNESS OF BREATH: 0

## 2022-03-21 ENCOUNTER — NURSE ONLY (OUTPATIENT)
Dept: FAMILY MEDICINE CLINIC | Age: 52
End: 2022-03-21

## 2022-03-21 DIAGNOSIS — Z23 NEED FOR TUBERCULOSIS VACCINATION: Primary | ICD-10-CM

## 2022-03-21 PROCEDURE — 99999 PR OFFICE/OUTPT VISIT,PROCEDURE ONLY: CPT | Performed by: FAMILY MEDICINE

## 2022-03-23 DIAGNOSIS — Z12.11 SCREEN FOR COLON CANCER: ICD-10-CM

## 2022-03-23 LAB
CONTROL: PRESENT
HEMOCCULT STL QL: POSITIVE

## 2022-03-23 PROCEDURE — 82274 ASSAY TEST FOR BLOOD FECAL: CPT | Performed by: FAMILY MEDICINE

## 2022-03-24 ENCOUNTER — TELEPHONE (OUTPATIENT)
Dept: FAMILY MEDICINE CLINIC | Age: 52
End: 2022-03-24

## 2022-03-24 DIAGNOSIS — R19.5 POSITIVE FIT (FECAL IMMUNOCHEMICAL TEST): Primary | ICD-10-CM

## 2022-03-24 NOTE — TELEPHONE ENCOUNTER
----- Message from Darius Schofield MD sent at 3/24/2022  2:12 PM EDT -----  Please let patient know that her FIT test is positive. (mychart note sent as well). Wondering if she has a preference for specialist.  Thank you.

## 2022-03-24 NOTE — RESULT ENCOUNTER NOTE
Please let patient know that her FIT test is positive. (mychart note sent as well). Wondering if she has a preference for specialist.  Thank you.

## 2022-03-25 NOTE — TELEPHONE ENCOUNTER
Gwendolyn Patterson called back and would like referral sent to [facility] )

## 2022-04-19 ENCOUNTER — HOSPITAL ENCOUNTER (OUTPATIENT)
Age: 52
Discharge: HOME | End: 2022-04-19
Payer: COMMERCIAL

## 2022-04-19 LAB
BASOPHILS # BLD: 1 %
BASOPHILS ABSOLUTE: 0 THOU/MM3 (ref 0–0.1)
EOSINOPHIL # BLD: 2.1 %
EOSINOPHILS ABSOLUTE: 0.1 THOU/MM3 (ref 0–0.4)
ERYTHROCYTE [DISTWIDTH] IN BLOOD BY AUTOMATED COUNT: 13.2 % (ref 11.5–14.5)
ERYTHROCYTE [DISTWIDTH] IN BLOOD BY AUTOMATED COUNT: 47.1 FL (ref 35–45)
HCT VFR BLD CALC: 36.4 % (ref 37–47)
HEMOGLOBIN: 11.5 GM/DL (ref 12–16)
IMMATURE GRANS (ABS): 0.01 THOU/MM3 (ref 0–0.07)
IMMATURE GRANULOCYTES: 0.2 %
LYMPHOCYTES # BLD: 37.4 %
LYMPHOCYTES ABSOLUTE: 1.8 THOU/MM3 (ref 1–4.8)
MCH RBC QN AUTO: 30.4 PG (ref 26–33)
MCHC RBC AUTO-ENTMCNC: 31.6 GM/DL (ref 32.2–35.5)
MCV RBC AUTO: 96.3 FL (ref 81–99)
MONOCYTES # BLD: 8.4 %
MONOCYTES ABSOLUTE: 0.4 THOU/MM3 (ref 0.4–1.3)
NUCLEATED RED BLOOD CELLS: 0 /100 WBC
PLATELET # BLD: 210 THOU/MM3 (ref 130–400)
PMV BLD AUTO: 9.3 FL (ref 9.4–12.4)
RBC # BLD: 3.78 MILL/MM3 (ref 4.2–5.4)
SEG NEUTROPHILS: 50.9 %
SEGMENTED NEUTROPHILS ABSOLUTE COUNT: 2.4 THOU/MM3 (ref 1.8–7.7)
WBC # BLD: 4.8 THOU/MM3 (ref 4.8–10.8)

## 2022-04-19 PROCEDURE — 85025 COMPLETE CBC W/AUTO DIFF WBC: CPT

## 2022-04-19 PROCEDURE — 80053 COMPREHEN METABOLIC PANEL: CPT

## 2022-04-19 PROCEDURE — 83540 ASSAY OF IRON: CPT

## 2022-04-19 PROCEDURE — 84466 ASSAY OF TRANSFERRIN: CPT

## 2022-04-19 PROCEDURE — 36415 COLL VENOUS BLD VENIPUNCTURE: CPT

## 2022-04-19 PROCEDURE — 82728 ASSAY OF FERRITIN: CPT

## 2022-04-20 LAB
ALBUMIN SERPL-MCNC: 4.5 G/DL (ref 3.5–5.1)
ALP BLD-CCNC: 56 U/L (ref 38–126)
ALT SERPL-CCNC: 14 U/L (ref 11–66)
ANION GAP SERPL CALCULATED.3IONS-SCNC: 12 MEQ/L (ref 8–16)
AST SERPL-CCNC: 26 U/L (ref 5–40)
BILIRUB SERPL-MCNC: 0.3 MG/DL (ref 0.3–1.2)
BUN BLDV-MCNC: 17 MG/DL (ref 7–22)
CALCIUM SERPL-MCNC: 9.6 MG/DL (ref 8.5–10.5)
CHLORIDE BLD-SCNC: 100 MEQ/L (ref 98–111)
CO2: 26 MEQ/L (ref 23–33)
CREAT SERPL-MCNC: 0.8 MG/DL (ref 0.4–1.2)
FERRITIN: 35 NG/ML (ref 10–291)
GFR SERPL CREATININE-BSD FRML MDRD: 75 ML/MIN/1.73M2
GLUCOSE BLD-MCNC: 88 MG/DL (ref 70–108)
IRON: 146 UG/DL (ref 50–170)
POTASSIUM SERPL-SCNC: 4.3 MEQ/L (ref 3.5–5.2)
SODIUM BLD-SCNC: 138 MEQ/L (ref 135–145)
TOTAL IRON BINDING CAPACITY: 343 UG/DL (ref 171–450)
TOTAL PROTEIN: 7.1 G/DL (ref 6.1–8)
TRANSFERRIN: 285 MG/DL (ref 200–360)

## 2022-04-29 ENCOUNTER — OFFICE VISIT (OUTPATIENT)
Dept: FAMILY MEDICINE CLINIC | Age: 52
End: 2022-04-29
Payer: COMMERCIAL

## 2022-04-29 VITALS
SYSTOLIC BLOOD PRESSURE: 122 MMHG | HEIGHT: 69 IN | WEIGHT: 114 LBS | BODY MASS INDEX: 16.88 KG/M2 | TEMPERATURE: 98.2 F | HEART RATE: 60 BPM | DIASTOLIC BLOOD PRESSURE: 72 MMHG

## 2022-04-29 DIAGNOSIS — F43.9 SITUATIONAL STRESS: ICD-10-CM

## 2022-04-29 DIAGNOSIS — J06.9 VIRAL URI: ICD-10-CM

## 2022-04-29 DIAGNOSIS — F90.0 ATTENTION DEFICIT HYPERACTIVITY DISORDER (ADHD), PREDOMINANTLY INATTENTIVE TYPE: Primary | ICD-10-CM

## 2022-04-29 DIAGNOSIS — E05.90 HYPERTHYROIDISM, SUBCLINICAL: Chronic | ICD-10-CM

## 2022-04-29 DIAGNOSIS — R63.6 UNDERWEIGHT: ICD-10-CM

## 2022-04-29 DIAGNOSIS — R00.2 PALPITATIONS: ICD-10-CM

## 2022-04-29 DIAGNOSIS — D64.9 ANEMIA, UNSPECIFIED TYPE: ICD-10-CM

## 2022-04-29 PROBLEM — G47.8 UNREFRESHED BY SLEEP: Status: RESOLVED | Noted: 2020-08-05 | Resolved: 2022-04-29

## 2022-04-29 PROCEDURE — 99214 OFFICE O/P EST MOD 30 MIN: CPT | Performed by: FAMILY MEDICINE

## 2022-04-29 PROCEDURE — 1036F TOBACCO NON-USER: CPT | Performed by: FAMILY MEDICINE

## 2022-04-29 PROCEDURE — G8427 DOCREV CUR MEDS BY ELIG CLIN: HCPCS | Performed by: FAMILY MEDICINE

## 2022-04-29 PROCEDURE — 3017F COLORECTAL CA SCREEN DOC REV: CPT | Performed by: FAMILY MEDICINE

## 2022-04-29 PROCEDURE — G8419 CALC BMI OUT NRM PARAM NOF/U: HCPCS | Performed by: FAMILY MEDICINE

## 2022-04-29 RX ORDER — FERROUS SULFATE 325(65) MG
325 TABLET ORAL
COMMUNITY

## 2022-04-29 RX ORDER — ATOMOXETINE 60 MG/1
60 CAPSULE ORAL DAILY
Qty: 90 CAPSULE | Refills: 3 | Status: SHIPPED | OUTPATIENT
Start: 2022-04-29 | End: 2022-11-04 | Stop reason: SDUPTHER

## 2022-04-29 RX ORDER — BIOTIN 10 MG
10 TABLET ORAL DAILY
COMMUNITY

## 2022-04-29 RX ORDER — ESCITALOPRAM OXALATE 10 MG/1
10 TABLET ORAL DAILY
Qty: 90 TABLET | Refills: 3 | Status: SHIPPED | OUTPATIENT
Start: 2022-04-29 | End: 2022-11-04 | Stop reason: SDUPTHER

## 2022-04-29 RX ORDER — ATENOLOL 25 MG/1
25 TABLET ORAL DAILY PRN
Qty: 30 TABLET | Refills: 5 | Status: SHIPPED | OUTPATIENT
Start: 2022-04-29

## 2022-04-29 RX ORDER — MULTIVIT-MIN/IRON/FOLIC ACID/K 18-600-40
CAPSULE ORAL
COMMUNITY

## 2022-04-29 NOTE — PROGRESS NOTES
Jacqueline Douglas (:  1970) is a 47 y.o. female,Established patient, here for evaluation of the following chief complaint(s):  6 Month Follow-Up and Discuss Labs         ASSESSMENT/PLAN:  1. Attention deficit hyperactivity disorder (ADHD), predominantly inattentive type  -     atomoxetine (STRATTERA) 60 MG capsule; Take 1 capsule by mouth daily, Disp-90 capsule, R-3Normal  2. Palpitations  -     atenolol (TENORMIN) 25 MG tablet; Take 1 tablet by mouth daily as needed (heart), Disp-30 tablet, R-5Normal  3. Situational stress  -     escitalopram (LEXAPRO) 10 MG tablet; Take 1 tablet by mouth daily, Disp-90 tablet, R-3Normal  4. Anemia, unspecified type  5. Hyperthyroidism, subclinical  -     TSH With Reflex Ft4; Future  6. Viral URI    Will increase Strattera to 60 mg from 40 mg. She will monitor and make sure that it works for her. Atenolol refilled. Uses minimal.  For her viral cold she may try Tylenol Sinus to help with the congestion and dry up what is left. Nasal saline and humidifier along with good fluid intake is also recommended. Anemia with a stool positive for heme is being investigated by GI. She will do an update of her thyroid to make sure that does not affecting the anemia. Patient has been underweight for most of her life. It will be interesting if the GI work-up would lead to some answer regarding her weight troubles. Return in about 6 months (around 10/29/2022). Subjective   SUBJECTIVE/OBJECTIVE:  HPI     Next week has daughters wedding. Having now yellow drainage with cold symptoms lingering for a week. No fever/chills. 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 She has had a decent winter but just lately caught some mild congestion. Patient had a positive fit test.  She is sent to GI. Last year she had an 11.9 hemoglobin and now is 11.5. Further investigation has been done and will be planned after the daughter's wedding.     The GI doctor noted Yellow looking nails. Ridging not new. She also has been doing a tanning solution which may have affected the color. However the white spots in the middle part of the nail seem to be more noticeable. She has history of hyperthyroidism this been subclinical with a November check that was pretty good. Also Raynaud's phenomenon has been noted. Monthly periods, especially 3 months. Not heavy last two months. 3 months ago had very heavy period for 1-2 days. Patient has struggled with weight but is actually maintaining better than in the past.  With her diagnosis of ADD we have stayed away from stimulants. She is doing okay on Strattera. The ADD is actually a fairly new diagnoses for her but she struggled most of her life but it was mislabeled. And working with a counselor which continues the ADD was diagnosed. The Strattera has helped but she is wondering if a higher dose would help her more. Wt Readings from Last 3 Encounters:   04/29/22 114 lb (51.7 kg)   11/12/21 115 lb (52.2 kg)   05/05/21 108 lb (49 kg)     Stress/anxiety and some depression have been a struggle for her because of the stress with her 's condition. He has had lymphoma but also has struggled mentally with her having had an affair. He is in counseling but is not addressing his emotional pain. He seems to have difficulty with forgiveness and letting go. Therefore unfortunately patient reports that she is called names when there is trying to spend some time together but then he will turn around and try to be kind and snugly. There has been worsening over the year instead of improvement. She is questioning what she will do with her marriage for the rest of her life. Review of Systems   No fevers no chills. No shortness of breath or chest pain. No dizziness.     Objective   Physical Exam    Gen: NAD, AAO x 3, coherent, pleasant    HEENT: PERRL, EOMs nl, conjunctiva clear bilaterally, OP clear with Mucous membranes are moist. NP clear. TMs clear bilaterally     Small LAD on left side. CTAB. RRR no murmur     Psych: normal affect and mood appropriate. Lab Results   Component Value Date    WBC 4.8 04/19/2022    HGB 11.5 (L) 04/19/2022    HCT 36.4 (L) 04/19/2022    MCV 96.3 04/19/2022     04/19/2022     Lab Results   Component Value Date     04/19/2022    K 4.3 04/19/2022     04/19/2022    CO2 26 04/19/2022    BUN 17 04/19/2022    CREATININE 0.8 04/19/2022    GLUCOSE 88 04/19/2022    CALCIUM 9.6 04/19/2022    PROT 7.1 04/19/2022    LABALBU 4.5 04/19/2022    BILITOT 0.3 04/19/2022    ALKPHOS 56 04/19/2022    AST 26 04/19/2022    ALT 14 04/19/2022    LABGLOM 75 (A) 04/19/2022     Lab Results   Component Value Date    IRON 146 04/19/2022    TIBC 343 04/19/2022    FERRITIN 35 04/19/2022       Lab Results   Component Value Date    TSH 0.597 11/09/2021     Lab Results   Component Value Date    CHOL 188 10/09/2021    CHOL 188 08/11/2020    CHOL 166 05/11/2019     Lab Results   Component Value Date    TRIG 65 10/09/2021    TRIG 132 08/11/2020    TRIG 95 05/11/2019     Lab Results   Component Value Date    HDL 66 10/09/2021    HDL 54 08/11/2020    HDL 54 05/11/2019     Lab Results   Component Value Date    LDLCALC 109 10/09/2021    LDLCALC 108 08/11/2020    LDLCALC 93 05/11/2019     Lab Results   Component Value Date    VLDL 13 10/09/2021    VLDL 19 05/11/2019     Lab Results   Component Value Date    CHOLHDLRATIO 1.7 10/09/2021       An electronic signature was used to authenticate this note.     --Ange Starr MD

## 2022-05-14 LAB
ALBUMIN SERPL-MCNC: 4.1 G/DL
ALP BLD-CCNC: 51 U/L
ALT SERPL-CCNC: 14 U/L
ANION GAP SERPL CALCULATED.3IONS-SCNC: 1.3 MMOL/L
AST SERPL-CCNC: 20 U/L
BASOPHILS ABSOLUTE: NORMAL
BASOPHILS RELATIVE PERCENT: NORMAL
BILIRUB SERPL-MCNC: 0.6 MG/DL (ref 0.1–1.4)
BUN BLDV-MCNC: 24 MG/DL
CALCIUM SERPL-MCNC: 9 MG/DL
CHLORIDE BLD-SCNC: 102 MMOL/L
CHOLESTEROL, TOTAL: 159 MG/DL
CHOLESTEROL/HDL RATIO: NORMAL
CO2: 30 MMOL/L
CREAT SERPL-MCNC: 0.7 MG/DL
EOSINOPHILS ABSOLUTE: 0.1 /ΜL
EOSINOPHILS RELATIVE PERCENT: NORMAL
GFR CALCULATED: 88
GLUCOSE BLD-MCNC: 65 MG/DL
HCT VFR BLD CALC: 38.1 % (ref 36–46)
HDLC SERPL-MCNC: 62 MG/DL (ref 35–70)
HEMOGLOBIN: 12.8 G/DL (ref 12–16)
LDL CHOLESTEROL CALCULATED: 82 MG/DL (ref 0–160)
LYMPHOCYTES ABSOLUTE: 1.1 /ΜL
LYMPHOCYTES RELATIVE PERCENT: NORMAL
MCH RBC QN AUTO: 31.5 PG
MCHC RBC AUTO-ENTMCNC: 33.7 G/DL
MCV RBC AUTO: 93.6 FL
MONOCYTES ABSOLUTE: 0.3 /ΜL
MONOCYTES RELATIVE PERCENT: NORMAL
NEUTROPHILS ABSOLUTE: 2 /ΜL
NEUTROPHILS RELATIVE PERCENT: NORMAL
NONHDLC SERPL-MCNC: NORMAL MG/DL
PDW BLD-RTO: 14.1 %
PLATELET # BLD: 270 K/ΜL
PMV BLD AUTO: 7 FL
POTASSIUM SERPL-SCNC: 4.3 MMOL/L
RBC # BLD: 4.07 10^6/ΜL
SODIUM BLD-SCNC: 138 MMOL/L
TOTAL PROTEIN: 7.3
TRIGL SERPL-MCNC: 75 MG/DL
TSH SERPL DL<=0.05 MIU/L-ACNC: 0.21 UIU/ML
VLDLC SERPL CALC-MCNC: 15 MG/DL
WBC # BLD: 3.5 10^3/ML

## 2022-07-17 ENCOUNTER — PATIENT MESSAGE (OUTPATIENT)
Dept: FAMILY MEDICINE CLINIC | Age: 52
End: 2022-07-17

## 2022-07-17 DIAGNOSIS — D72.819 LEUKOPENIA, UNSPECIFIED TYPE: Primary | ICD-10-CM

## 2022-08-16 NOTE — TELEPHONE ENCOUNTER
The LDL needs to be reentered. It is entered as a LDL ratio not as the LDL true value. TSH needs to be data entered into the system for me to follow.       Thank you

## 2022-10-15 LAB
ALBUMIN SERPL-MCNC: 4.5 G/DL
ALP BLD-CCNC: 53 U/L
ALT SERPL-CCNC: 15 U/L
ANION GAP SERPL CALCULATED.3IONS-SCNC: 1.5 MMOL/L
AST SERPL-CCNC: 24 U/L
BASOPHILS ABSOLUTE: 0.1 /ΜL
BASOPHILS RELATIVE PERCENT: 1.3 %
BILIRUB SERPL-MCNC: 0.6 MG/DL (ref 0.1–1.4)
BILIRUBIN DIRECT: 0.1 MG/DL
BUN BLDV-MCNC: 17 MG/DL
CALCIUM SERPL-MCNC: 9.5 MG/DL
CHLORIDE BLD-SCNC: 104 MMOL/L
CHOLESTEROL, TOTAL: 205 MG/DL
CHOLESTEROL/HDL RATIO: NORMAL
CO2: 31 MMOL/L
CREAT SERPL-MCNC: 0.8 MG/DL
EOSINOPHILS ABSOLUTE: 0.1 /ΜL
EOSINOPHILS RELATIVE PERCENT: 1.8 %
GFR CALCULATED: 75
GLUCOSE BLD-MCNC: 76 MG/DL
HCT VFR BLD CALC: 39.3 % (ref 36–46)
HDLC SERPL-MCNC: 63 MG/DL (ref 35–70)
HEMOGLOBIN: 13.3 G/DL (ref 12–16)
LDL CHOLESTEROL CALCULATED: 121 MG/DL (ref 0–160)
LDL CHOLESTEROL DIRECT: NORMAL
LYMPHOCYTES ABSOLUTE: 1.2 /ΜL
LYMPHOCYTES RELATIVE PERCENT: 28.1 %
MCH RBC QN AUTO: 31.6 PG
MCHC RBC AUTO-ENTMCNC: 33.7 G/DL
MCV RBC AUTO: 93.7 FL
MONOCYTES ABSOLUTE: 0.3 /ΜL
MONOCYTES RELATIVE PERCENT: 6.4 %
NEUTROPHILS ABSOLUTE: 2.6 /ΜL
NEUTROPHILS RELATIVE PERCENT: 62.4 %
PDW BLD-RTO: 13 %
PHOSPHORUS: 3.2 MG/DL
PLATELET # BLD: 232 K/ΜL
PMV BLD AUTO: 6.7 FL
POTASSIUM SERPL-SCNC: 4.6 MMOL/L
RBC # BLD: 4.2 10^6/ΜL
SODIUM BLD-SCNC: 142 MMOL/L
TOTAL PROTEIN: 7.6
TRIGL SERPL-MCNC: 106 MG/DL
VLDLC SERPL CALC-MCNC: 21 MG/DL
WBC # BLD: 4.2 10^3/ML

## 2022-11-04 ENCOUNTER — OFFICE VISIT (OUTPATIENT)
Dept: FAMILY MEDICINE CLINIC | Age: 52
End: 2022-11-04
Payer: COMMERCIAL

## 2022-11-04 VITALS
HEART RATE: 68 BPM | WEIGHT: 111 LBS | DIASTOLIC BLOOD PRESSURE: 68 MMHG | BODY MASS INDEX: 16.44 KG/M2 | TEMPERATURE: 97.4 F | OXYGEN SATURATION: 98 % | SYSTOLIC BLOOD PRESSURE: 122 MMHG | HEIGHT: 69 IN

## 2022-11-04 DIAGNOSIS — F43.9 SITUATIONAL STRESS: ICD-10-CM

## 2022-11-04 DIAGNOSIS — R00.2 PALPITATIONS: ICD-10-CM

## 2022-11-04 DIAGNOSIS — F90.0 ATTENTION DEFICIT HYPERACTIVITY DISORDER (ADHD), PREDOMINANTLY INATTENTIVE TYPE: Primary | ICD-10-CM

## 2022-11-04 DIAGNOSIS — Z23 NEED FOR VACCINATION: ICD-10-CM

## 2022-11-04 DIAGNOSIS — J06.9 VIRAL URI: ICD-10-CM

## 2022-11-04 PROCEDURE — G8427 DOCREV CUR MEDS BY ELIG CLIN: HCPCS | Performed by: FAMILY MEDICINE

## 2022-11-04 PROCEDURE — 3017F COLORECTAL CA SCREEN DOC REV: CPT | Performed by: FAMILY MEDICINE

## 2022-11-04 PROCEDURE — 99214 OFFICE O/P EST MOD 30 MIN: CPT | Performed by: FAMILY MEDICINE

## 2022-11-04 PROCEDURE — 90471 IMMUNIZATION ADMIN: CPT | Performed by: FAMILY MEDICINE

## 2022-11-04 PROCEDURE — G8482 FLU IMMUNIZE ORDER/ADMIN: HCPCS | Performed by: FAMILY MEDICINE

## 2022-11-04 PROCEDURE — 90674 CCIIV4 VAC NO PRSV 0.5 ML IM: CPT | Performed by: FAMILY MEDICINE

## 2022-11-04 PROCEDURE — 1036F TOBACCO NON-USER: CPT | Performed by: FAMILY MEDICINE

## 2022-11-04 PROCEDURE — G8419 CALC BMI OUT NRM PARAM NOF/U: HCPCS | Performed by: FAMILY MEDICINE

## 2022-11-04 RX ORDER — ATOMOXETINE 60 MG/1
60 CAPSULE ORAL DAILY
Qty: 90 CAPSULE | Refills: 3 | Status: SHIPPED | OUTPATIENT
Start: 2022-11-04

## 2022-11-04 RX ORDER — ESCITALOPRAM OXALATE 10 MG/1
10 TABLET ORAL DAILY
Qty: 90 TABLET | Refills: 3 | Status: CANCELLED | OUTPATIENT
Start: 2022-11-04

## 2022-11-04 RX ORDER — ATENOLOL 25 MG/1
25 TABLET ORAL DAILY PRN
Qty: 30 TABLET | Refills: 3 | Status: CANCELLED | OUTPATIENT
Start: 2022-11-04

## 2022-11-04 RX ORDER — FLUOROURACIL 50 MG/G
CREAM TOPICAL
COMMUNITY
Start: 2022-10-22

## 2022-11-04 RX ORDER — ESCITALOPRAM OXALATE 10 MG/1
10 TABLET ORAL DAILY
Qty: 90 TABLET | Refills: 3 | Status: SHIPPED | OUTPATIENT
Start: 2022-11-04

## 2022-11-04 ASSESSMENT — PATIENT HEALTH QUESTIONNAIRE - PHQ9
3. TROUBLE FALLING OR STAYING ASLEEP: 0
6. FEELING BAD ABOUT YOURSELF - OR THAT YOU ARE A FAILURE OR HAVE LET YOURSELF OR YOUR FAMILY DOWN: 0
1. LITTLE INTEREST OR PLEASURE IN DOING THINGS: 0
SUM OF ALL RESPONSES TO PHQ QUESTIONS 1-9: 0
10. IF YOU CHECKED OFF ANY PROBLEMS, HOW DIFFICULT HAVE THESE PROBLEMS MADE IT FOR YOU TO DO YOUR WORK, TAKE CARE OF THINGS AT HOME, OR GET ALONG WITH OTHER PEOPLE: 0
9. THOUGHTS THAT YOU WOULD BE BETTER OFF DEAD, OR OF HURTING YOURSELF: 0
2. FEELING DOWN, DEPRESSED OR HOPELESS: 0
7. TROUBLE CONCENTRATING ON THINGS, SUCH AS READING THE NEWSPAPER OR WATCHING TELEVISION: 0
SUM OF ALL RESPONSES TO PHQ QUESTIONS 1-9: 0
4. FEELING TIRED OR HAVING LITTLE ENERGY: 0
5. POOR APPETITE OR OVEREATING: 0
8. MOVING OR SPEAKING SO SLOWLY THAT OTHER PEOPLE COULD HAVE NOTICED. OR THE OPPOSITE, BEING SO FIGETY OR RESTLESS THAT YOU HAVE BEEN MOVING AROUND A LOT MORE THAN USUAL: 0
SUM OF ALL RESPONSES TO PHQ9 QUESTIONS 1 & 2: 0

## 2022-11-04 ASSESSMENT — ENCOUNTER SYMPTOMS: SHORTNESS OF BREATH: 0

## 2022-11-04 NOTE — PROGRESS NOTES
Vaccine Information Sheet, \"Influenza - Inactivated\"  given to Solomon Garcia, or parent/legal guardian of  Solomon Garcia and verbalized understanding. Patient responses:    Have you ever had a reaction to a flu vaccine? No  Do you have an allergy to eggs, neomycin or polymixin? No  Do you have an allergy to Thimerosal, contact lens solution, or Merthiolate? No  Have you ever had Guillian Langley Syndrome? No  Do you have any current illness? No  Do you have a temperature above 100 degrees? No  Are you pregnant? No  If pregnant, permission obtained from physician? Do you have an active neurological disorder? No      Flu vaccine given per order. Please see immunization tab.

## 2022-11-04 NOTE — PATIENT INSTRUCTIONS
Add 15 grams of protein to your day   -- try a protein smoothie with some healthy carbohydrates.    -- may use whey protein or pea protein
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
## 2023-05-10 SDOH — ECONOMIC STABILITY: HOUSING INSECURITY
IN THE LAST 12 MONTHS, WAS THERE A TIME WHEN YOU DID NOT HAVE A STEADY PLACE TO SLEEP OR SLEPT IN A SHELTER (INCLUDING NOW)?: NO

## 2023-05-10 SDOH — ECONOMIC STABILITY: FOOD INSECURITY: WITHIN THE PAST 12 MONTHS, THE FOOD YOU BOUGHT JUST DIDN'T LAST AND YOU DIDN'T HAVE MONEY TO GET MORE.: NEVER TRUE

## 2023-05-10 SDOH — ECONOMIC STABILITY: INCOME INSECURITY: HOW HARD IS IT FOR YOU TO PAY FOR THE VERY BASICS LIKE FOOD, HOUSING, MEDICAL CARE, AND HEATING?: NOT VERY HARD

## 2023-05-10 SDOH — ECONOMIC STABILITY: FOOD INSECURITY: WITHIN THE PAST 12 MONTHS, YOU WORRIED THAT YOUR FOOD WOULD RUN OUT BEFORE YOU GOT MONEY TO BUY MORE.: NEVER TRUE

## 2023-05-10 SDOH — ECONOMIC STABILITY: TRANSPORTATION INSECURITY
IN THE PAST 12 MONTHS, HAS LACK OF TRANSPORTATION KEPT YOU FROM MEETINGS, WORK, OR FROM GETTING THINGS NEEDED FOR DAILY LIVING?: NO

## 2023-05-12 ENCOUNTER — OFFICE VISIT (OUTPATIENT)
Dept: FAMILY MEDICINE CLINIC | Age: 53
End: 2023-05-12

## 2023-05-12 VITALS
SYSTOLIC BLOOD PRESSURE: 110 MMHG | WEIGHT: 109 LBS | HEART RATE: 59 BPM | TEMPERATURE: 97.5 F | DIASTOLIC BLOOD PRESSURE: 64 MMHG | HEIGHT: 69 IN | BODY MASS INDEX: 16.14 KG/M2 | OXYGEN SATURATION: 100 %

## 2023-05-12 DIAGNOSIS — H04.123 DRY MOUTH AND EYES: ICD-10-CM

## 2023-05-12 DIAGNOSIS — Z00.00 HEALTHCARE MAINTENANCE: ICD-10-CM

## 2023-05-12 DIAGNOSIS — R00.2 PALPITATIONS: ICD-10-CM

## 2023-05-12 DIAGNOSIS — F90.0 ATTENTION DEFICIT HYPERACTIVITY DISORDER (ADHD), PREDOMINANTLY INATTENTIVE TYPE: Primary | ICD-10-CM

## 2023-05-12 DIAGNOSIS — R68.2 DRY MOUTH AND EYES: ICD-10-CM

## 2023-05-12 DIAGNOSIS — R63.4 WEIGHT LOSS: ICD-10-CM

## 2023-05-12 DIAGNOSIS — F43.9 SITUATIONAL STRESS: ICD-10-CM

## 2023-05-12 RX ORDER — ATOMOXETINE 80 MG/1
80 CAPSULE ORAL DAILY
Qty: 90 CAPSULE | Refills: 3 | Status: SHIPPED | OUTPATIENT
Start: 2023-05-12

## 2023-05-12 RX ORDER — ATENOLOL 25 MG/1
25 TABLET ORAL DAILY PRN
Qty: 30 TABLET | Refills: 5 | Status: SHIPPED | OUTPATIENT
Start: 2023-05-12

## 2023-05-12 ASSESSMENT — PATIENT HEALTH QUESTIONNAIRE - PHQ9
SUM OF ALL RESPONSES TO PHQ QUESTIONS 1-9: 0
SUM OF ALL RESPONSES TO PHQ9 QUESTIONS 1 & 2: 0
1. LITTLE INTEREST OR PLEASURE IN DOING THINGS: 0
SUM OF ALL RESPONSES TO PHQ QUESTIONS 1-9: 0
2. FEELING DOWN, DEPRESSED OR HOPELESS: 0
SUM OF ALL RESPONSES TO PHQ QUESTIONS 1-9: 0
SUM OF ALL RESPONSES TO PHQ QUESTIONS 1-9: 0
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
## 2023-05-14 ASSESSMENT — ENCOUNTER SYMPTOMS: SHORTNESS OF BREATH: 0

## 2023-06-09 ENCOUNTER — HOSPITAL ENCOUNTER (OUTPATIENT)
Age: 53
Discharge: HOME | End: 2023-06-09
Payer: COMMERCIAL

## 2023-06-09 DIAGNOSIS — H04.123 DRY MOUTH AND EYES: ICD-10-CM

## 2023-06-09 DIAGNOSIS — R68.2 DRY MOUTH AND EYES: ICD-10-CM

## 2023-06-09 PROCEDURE — 86235 NUCLEAR ANTIGEN ANTIBODY: CPT

## 2023-06-09 PROCEDURE — 36415 COLL VENOUS BLD VENIPUNCTURE: CPT

## 2023-06-11 LAB
ENA SS-A IGG SER IA-ACNC: NORMAL
ENA SS-B IGG SER IA-ACNC: 0 AU/ML (ref 0–40)

## 2023-08-23 ENCOUNTER — COMMUNITY OUTREACH (OUTPATIENT)
Dept: FAMILY MEDICINE CLINIC | Age: 53
End: 2023-08-23

## 2023-09-26 ENCOUNTER — OFFICE VISIT (OUTPATIENT)
Dept: FAMILY MEDICINE CLINIC | Age: 53
End: 2023-09-26
Payer: COMMERCIAL

## 2023-09-26 ENCOUNTER — HOSPITAL ENCOUNTER (OUTPATIENT)
Age: 53
Discharge: HOME | End: 2023-09-26
Payer: COMMERCIAL

## 2023-09-26 VITALS
TEMPERATURE: 97.5 F | RESPIRATION RATE: 16 BRPM | DIASTOLIC BLOOD PRESSURE: 60 MMHG | BODY MASS INDEX: 17.03 KG/M2 | SYSTOLIC BLOOD PRESSURE: 90 MMHG | HEIGHT: 69 IN | HEART RATE: 74 BPM | OXYGEN SATURATION: 100 % | WEIGHT: 115 LBS

## 2023-09-26 DIAGNOSIS — R00.0 TACHYCARDIA: ICD-10-CM

## 2023-09-26 DIAGNOSIS — R10.9 FLANK PAIN: ICD-10-CM

## 2023-09-26 DIAGNOSIS — E05.90 HYPERTHYROIDISM, SUBCLINICAL: Chronic | ICD-10-CM

## 2023-09-26 DIAGNOSIS — R31.9 HEMATURIA OF UNKNOWN CAUSE: Primary | ICD-10-CM

## 2023-09-26 DIAGNOSIS — N39.0 URINARY TRACT INFECTION WITH HEMATURIA, SITE UNSPECIFIED: ICD-10-CM

## 2023-09-26 DIAGNOSIS — R31.9 URINARY TRACT INFECTION WITH HEMATURIA, SITE UNSPECIFIED: ICD-10-CM

## 2023-09-26 LAB
BILIRUBIN, POC: NORMAL
BLOOD URINE, POC: NORMAL
CLARITY, POC: NORMAL
COLOR, POC: NORMAL
GLUCOSE URINE, POC: NORMAL
KETONES, POC: NORMAL
LEUKOCYTE EST, POC: NORMAL
NITRITE, POC: NORMAL
PH, POC: 5.5
PROTEIN, POC: NORMAL
SPECIFIC GRAVITY, POC: 1.02
UROBILINOGEN, POC: 0.2

## 2023-09-26 PROCEDURE — 36415 COLL VENOUS BLD VENIPUNCTURE: CPT

## 2023-09-26 PROCEDURE — 93000 ELECTROCARDIOGRAM COMPLETE: CPT | Performed by: NURSE PRACTITIONER

## 2023-09-26 PROCEDURE — 99214 OFFICE O/P EST MOD 30 MIN: CPT | Performed by: NURSE PRACTITIONER

## 2023-09-26 PROCEDURE — 84443 ASSAY THYROID STIM HORMONE: CPT

## 2023-09-26 PROCEDURE — 81003 URINALYSIS AUTO W/O SCOPE: CPT | Performed by: NURSE PRACTITIONER

## 2023-09-26 RX ORDER — CIPROFLOXACIN 500 MG/1
500 TABLET, FILM COATED ORAL 2 TIMES DAILY
Qty: 14 TABLET | Refills: 0 | Status: SHIPPED | OUTPATIENT
Start: 2023-09-26 | End: 2023-10-03

## 2023-09-26 NOTE — PROGRESS NOTES
[facility]  [address]  [facility] [address]  Dept: [phone]  Loc: [address] [place] [address] Gertrude Wagoner (:  1970) is a 46 y.o. female, here for evaluation of the following chief complaint(s):  Generalized Body Aches (Pain in right lower back that radiated around to the front. started last  and throughout this week. Had blood in urine on . Has had dry mouth, but has been drinking liquid IV drinks. ), Sweats (Woke up drenched in sweat for 3 nights in a row.  ), and Tachycardia (Heart was racing on 9/10 and pt took two atenelol. Apple watch showed afib and high HR)      ASSESSMENT/PLAN:  1. Hematuria of unknown cause  -     POCT Urinalysis No Micro (Auto)  -     CT ABDOMEN PELVIS WO CONTRAST Additional Contrast? Radiologist Recommendation; Future  2. Tachycardia  -     EKG 12 lead; Future  -     CT ABDOMEN PELVIS WO CONTRAST Additional Contrast? Radiologist Recommendation; Future  3. Urinary tract infection with hematuria, site unspecified  -     ciprofloxacin (CIPRO) 500 MG tablet; Take 1 tablet by mouth 2 times daily for 7 days, Disp-14 tablet, R-0Normal  -     CT ABDOMEN PELVIS WO CONTRAST Additional Contrast? Radiologist Recommendation; Future  -     Culture, Urine  4. Flank pain  -     CT ABDOMEN PELVIS WO CONTRAST Additional Contrast? Radiologist Recommendation; Future  5. Hyperthyroidism, subclinical  -     TSH with Reflex; Future    Consulted Dr Yancy Marks regarding plan of care. EKG for concerns for afib from [address] [place] [address] - EKG is normal.  Will obtain CT abdomen for CVA tenderness, hematuria and UTI. Will start antibiotic for UTI. Patient advised of red flag symptoms and when to report to ER. Will also update thyroid labs with tachycardia. Return for Regular follow up as scheduled and as needed.     SUBJECTIVE/OBJECTIVE:  Patient states she has had tachycardia and her Apple Watch alerted her that she was

## 2023-09-27 ENCOUNTER — TELEPHONE (OUTPATIENT)
Dept: FAMILY MEDICINE CLINIC | Age: 53
End: 2023-09-27

## 2023-09-27 ENCOUNTER — HOSPITAL ENCOUNTER (OUTPATIENT)
Dept: CT IMAGING | Age: 53
Discharge: HOME | End: 2023-09-27
Payer: COMMERCIAL

## 2023-09-27 DIAGNOSIS — R31.9 URINARY TRACT INFECTION WITH HEMATURIA, SITE UNSPECIFIED: ICD-10-CM

## 2023-09-27 DIAGNOSIS — R31.9 HEMATURIA OF UNKNOWN CAUSE: ICD-10-CM

## 2023-09-27 DIAGNOSIS — N39.0 URINARY TRACT INFECTION WITH HEMATURIA, SITE UNSPECIFIED: ICD-10-CM

## 2023-09-27 DIAGNOSIS — R00.0 TACHYCARDIA: ICD-10-CM

## 2023-09-27 DIAGNOSIS — R10.9 FLANK PAIN: ICD-10-CM

## 2023-09-27 LAB — TSH SERPL DL<=0.005 MIU/L-ACNC: 1.2 UIU/ML (ref 0.4–4.2)

## 2023-09-27 PROCEDURE — 74176 CT ABD & PELVIS W/O CONTRAST: CPT

## 2023-09-27 NOTE — TELEPHONE ENCOUNTER
----- Message from PASTOR Lopez CNP sent at 9/27/2023  8:42 AM EDT -----  Thyroid is in normal range.

## 2023-09-28 ENCOUNTER — TELEPHONE (OUTPATIENT)
Dept: FAMILY MEDICINE CLINIC | Age: 53
End: 2023-09-28

## 2023-09-28 LAB
BACTERIA UR CULT: ABNORMAL
ORGANISM: ABNORMAL

## 2023-09-28 NOTE — TELEPHONE ENCOUNTER
----- Message from PASTOR Cazares CNP sent at 9/28/2023 10:42 AM EDT -----  CT does show inflammation in the tubule that connects the bladder and kidney and mild inflammation of the kidney. This is likely the bladder infection ascending. Complete antibiotic as prescribed. As we discussed, if you have fever/chills, nausea/vomiting or worsening pain please go to ER. Also continue to push fluids to flush out the infection.

## 2023-10-02 ASSESSMENT — ENCOUNTER SYMPTOMS
CHEST TIGHTNESS: 0
COUGH: 0
SORE THROAT: 0
VOMITING: 0
ABDOMINAL PAIN: 0
EYE PAIN: 0
NAUSEA: 0
SHORTNESS OF BREATH: 0

## 2023-10-26 ENCOUNTER — TELEPHONE (OUTPATIENT)
Dept: FAMILY MEDICINE CLINIC | Age: 53
End: 2023-10-26

## 2023-10-26 NOTE — TELEPHONE ENCOUNTER
Patient informed lab results WNL except for mildly elevated platelets, will follow. No changes at this time.

## 2023-11-17 ENCOUNTER — OFFICE VISIT (OUTPATIENT)
Dept: FAMILY MEDICINE CLINIC | Age: 53
End: 2023-11-17
Payer: COMMERCIAL

## 2023-11-17 VITALS
BODY MASS INDEX: 16.74 KG/M2 | OXYGEN SATURATION: 100 % | WEIGHT: 113 LBS | TEMPERATURE: 97.6 F | HEIGHT: 69 IN | RESPIRATION RATE: 16 BRPM | DIASTOLIC BLOOD PRESSURE: 72 MMHG | SYSTOLIC BLOOD PRESSURE: 110 MMHG | HEART RATE: 85 BPM

## 2023-11-17 DIAGNOSIS — E05.90 HYPERTHYROIDISM, SUBCLINICAL: Chronic | ICD-10-CM

## 2023-11-17 DIAGNOSIS — Z12.31 ENCOUNTER FOR SCREENING MAMMOGRAM FOR BREAST CANCER: ICD-10-CM

## 2023-11-17 DIAGNOSIS — R63.6 UNDERWEIGHT: ICD-10-CM

## 2023-11-17 DIAGNOSIS — F43.9 SITUATIONAL STRESS: ICD-10-CM

## 2023-11-17 DIAGNOSIS — R00.0 TACHYCARDIA: Primary | ICD-10-CM

## 2023-11-17 DIAGNOSIS — F98.8 ATTENTION DEFICIT DISORDER (ADD) WITHOUT HYPERACTIVITY: ICD-10-CM

## 2023-11-17 PROCEDURE — G8482 FLU IMMUNIZE ORDER/ADMIN: HCPCS | Performed by: FAMILY MEDICINE

## 2023-11-17 PROCEDURE — 1036F TOBACCO NON-USER: CPT | Performed by: FAMILY MEDICINE

## 2023-11-17 PROCEDURE — 3017F COLORECTAL CA SCREEN DOC REV: CPT | Performed by: FAMILY MEDICINE

## 2023-11-17 PROCEDURE — G8427 DOCREV CUR MEDS BY ELIG CLIN: HCPCS | Performed by: FAMILY MEDICINE

## 2023-11-17 PROCEDURE — 99214 OFFICE O/P EST MOD 30 MIN: CPT | Performed by: FAMILY MEDICINE

## 2023-11-17 PROCEDURE — G8419 CALC BMI OUT NRM PARAM NOF/U: HCPCS | Performed by: FAMILY MEDICINE

## 2023-11-17 RX ORDER — ESCITALOPRAM OXALATE 10 MG/1
10 TABLET ORAL DAILY
Qty: 90 TABLET | Refills: 3 | Status: SHIPPED | OUTPATIENT
Start: 2023-11-17

## 2023-11-17 ASSESSMENT — ENCOUNTER SYMPTOMS: SHORTNESS OF BREATH: 0

## 2023-11-17 NOTE — PROGRESS NOTES
heard.  Pulmonary:      Effort: Pulmonary effort is normal. No respiratory distress. Breath sounds: Normal breath sounds. No wheezing. Musculoskeletal:         General: No swelling. Neurological:      Mental Status: She is alert and oriented to person, place, and time. Psychiatric:         Mood and Affect: Mood normal.          Lab Results   Component Value Date     10/07/2023    K 4.7 10/07/2023     10/07/2023    CO2 32 10/07/2023    BUN 13 10/07/2023    CREATININE 0.8 10/07/2023    CALCIUM 10.1 10/07/2023    GLUCOSE 84 10/07/2023        Lab Results   Component Value Date    CHOL 206 10/07/2023    CHOL 205 10/15/2022    CHOL 159 05/14/2022     Lab Results   Component Value Date    TRIG 87 10/07/2023    TRIG 106 10/15/2022    TRIG 75 05/14/2022     Lab Results   Component Value Date    HDL 68 10/07/2023    HDL 63 10/15/2022    HDL 62 05/14/2022     Lab Results   Component Value Date    LDLCALC 121 10/07/2023    LDLCALC 121 10/15/2022    LDLCALC 82 05/14/2022     Lab Results   Component Value Date    VLDL 17 10/07/2023    VLDL 21 10/15/2022    VLDL 15 05/14/2022     Lab Results   Component Value Date    CHOLHDLRATIO 1.7 10/09/2021       No results found for: \"MALBCR\"    Lab Results   Component Value Date    TSH 1.200 09/26/2023      Lab Results   Component Value Date    NHVJJBVS77 481 08/11/2020      No results found for: \"MG\"   Lab Results   Component Value Date    ALT 20 10/07/2023    AST 30 10/07/2023    ALKPHOS 66 10/07/2023    BILITOT 0.6 10/07/2023    BILIDIR 0.1 10/15/2022        Lab Results   Component Value Date    WBC 6.2 10/07/2023    HGB 13.0 10/07/2023    HCT 38.6 10/07/2023    MCV 93.2 10/07/2023     10/07/2023           This office note may have been at least partially dictated. Effort was made to review for errors but some may have been missed. Please contact Terryl Siemens of note for clarification if needed.      An electronic signature was used to authenticate this

## 2023-12-05 ENCOUNTER — APPOINTMENT (OUTPATIENT)
Dept: GENERAL RADIOLOGY | Age: 53
End: 2023-12-05
Payer: COMMERCIAL

## 2023-12-05 ENCOUNTER — HOSPITAL ENCOUNTER (EMERGENCY)
Age: 53
Discharge: HOME | End: 2023-12-05
Payer: COMMERCIAL

## 2023-12-05 VITALS
HEART RATE: 99 BPM | SYSTOLIC BLOOD PRESSURE: 144 MMHG | DIASTOLIC BLOOD PRESSURE: 69 MMHG | OXYGEN SATURATION: 100 % | BODY MASS INDEX: 15.75 KG/M2 | RESPIRATION RATE: 18 BRPM | TEMPERATURE: 98.3 F | HEIGHT: 70 IN | WEIGHT: 110 LBS

## 2023-12-05 DIAGNOSIS — S52.022A: Primary | ICD-10-CM

## 2023-12-05 PROCEDURE — 73110 X-RAY EXAM OF WRIST: CPT

## 2023-12-05 PROCEDURE — 73080 X-RAY EXAM OF ELBOW: CPT

## 2023-12-05 RX ORDER — IBUPROFEN 800 MG/1
800 TABLET ORAL 3 TIMES DAILY PRN
Qty: 60 TABLET | Refills: 0 | Status: SHIPPED | OUTPATIENT
Start: 2023-12-05

## 2023-12-05 RX ORDER — HYDROCODONE BITARTRATE AND ACETAMINOPHEN 5; 325 MG/1; MG/1
1 TABLET ORAL EVERY 6 HOURS PRN
Qty: 10 TABLET | Refills: 0 | Status: SHIPPED | OUTPATIENT
Start: 2023-12-05 | End: 2023-12-08

## 2023-12-05 ASSESSMENT — PAIN - FUNCTIONAL ASSESSMENT
PAIN_FUNCTIONAL_ASSESSMENT: 0-10
PAIN_FUNCTIONAL_ASSESSMENT: PREVENTS OR INTERFERES SOME ACTIVE ACTIVITIES AND ADLS

## 2023-12-05 ASSESSMENT — PAIN DESCRIPTION - LOCATION: LOCATION: ELBOW

## 2023-12-05 ASSESSMENT — PAIN SCALES - GENERAL: PAINLEVEL_OUTOF10: 5

## 2023-12-05 ASSESSMENT — PAIN DESCRIPTION - DESCRIPTORS: DESCRIPTORS: THROBBING;STABBING

## 2023-12-05 ASSESSMENT — PAIN DESCRIPTION - FREQUENCY: FREQUENCY: CONTINUOUS

## 2023-12-05 ASSESSMENT — PAIN DESCRIPTION - PAIN TYPE: TYPE: ACUTE PAIN

## 2023-12-05 ASSESSMENT — PAIN DESCRIPTION - ORIENTATION: ORIENTATION: LEFT

## 2023-12-05 NOTE — ED NOTES
PT GIVEN DISCHARGE INSTRUCTIONS, VERBALIZES UNDERSTANDING. PT ASSESSMENT UNCHANGED, DISCHARGED IN STABLE CONDITION.         Juan Whiting RN  12/05/23 [id number]

## 2023-12-05 NOTE — ED PROVIDER NOTES
[facility]  Urgent Care Encounter       CHIEF COMPLAINT       Chief Complaint   Patient presents with    Elbow Injury     Marion Wellss on her porch last night checking the mail and landed on left elbow       Nurses Notes reviewed and I agree except as noted in the HPI. HISTORY OF PRESENT ILLNESS   Zachariah Langston is a 46 y.o. female who presents for complaints of left elbow pain. Patient states she walked out onto her porch last evening when a hook on her boot got caught on a rail causing her to slip and fall. She states she fell directly onto her left elbow. She has been having pain to the left elbow since. She is unable to straighten the elbow. She has taken Excedrin for pain but continues to rate her pain 5 on a 10 scale. She states she feels some numbness and tingling down her forearm and wrist occasionally. She denies any other injuries from the fall. She did not strike her head. She did not lose consciousness. HPI    REVIEW OF SYSTEMS     Review of Systems   Constitutional:  Negative for activity change, fatigue and fever. Musculoskeletal:  Positive for arthralgias (left elbow). PAST MEDICAL HISTORY   History reviewed. No pertinent past medical history. SURGICALHISTORY     Patient  has no past surgical history on file.     CURRENT MEDICATIONS       Discharge Medication List as of 12/5/2023  8:56 AM        CONTINUE these medications which have NOT CHANGED    Details   escitalopram (LEXAPRO) 10 MG tablet Take 1 tablet by mouth daily, Disp-90 tablet, R-3Normal      atenolol (TENORMIN) 25 MG tablet Take 1 tablet by mouth daily as needed (heart), Disp-30 tablet, R-5Normal      atomoxetine (STRATTERA) 80 MG capsule Take 1 capsule by mouth daily, Disp-90 capsule, R-3Normal      COLLAGEN PO Take by mouthHistorical Med      Biotin 10 MG tablet Take 1 tablet by mouth dailyHistorical Med      ferrous sulfate (IRON 325) 325 (65 Fe) MG tablet Take 1 tablet by mouth daily (with Stemen, APRN - CNP    (Please note that portions of this note were completed with a voice recognition program. Efforts were made to edit the dictations but occasionally words are mis-transcribed.)           Nelida Ahumada, APRN - CNP  12/05/23 [id number]

## 2023-12-05 NOTE — DISCHARGE INSTRUCTIONS
Wear the sling until you are seen at [facility] [address] to the area frequently    Follow-up with orthopedic [place] today at the walk-in clinic from 8 AM to 4:00 PM

## 2024-03-25 ENCOUNTER — OFFICE VISIT (OUTPATIENT)
Dept: FAMILY MEDICINE CLINIC | Age: 54
End: 2024-03-25
Payer: COMMERCIAL

## 2024-03-25 VITALS
SYSTOLIC BLOOD PRESSURE: 90 MMHG | TEMPERATURE: 97.5 F | RESPIRATION RATE: 16 BRPM | DIASTOLIC BLOOD PRESSURE: 60 MMHG | OXYGEN SATURATION: 97 % | BODY MASS INDEX: 17.32 KG/M2 | HEART RATE: 68 BPM | HEIGHT: 70 IN | WEIGHT: 121 LBS

## 2024-03-25 DIAGNOSIS — N30.01 ACUTE CYSTITIS WITH HEMATURIA: Primary | ICD-10-CM

## 2024-03-25 DIAGNOSIS — N20.0 KIDNEY STONE: ICD-10-CM

## 2024-03-25 LAB
BILIRUBIN, POC: NORMAL
BLOOD URINE, POC: NORMAL
CLARITY, POC: NORMAL
COLOR, POC: YELLOW
GLUCOSE URINE, POC: NORMAL
KETONES, POC: NORMAL
LEUKOCYTE EST, POC: NORMAL
NITRITE, POC: POSITIVE
PH, POC: 6
PROTEIN, POC: 30
SPECIFIC GRAVITY, POC: 1.02
UROBILINOGEN, POC: 0.2

## 2024-03-25 PROCEDURE — 99214 OFFICE O/P EST MOD 30 MIN: CPT | Performed by: NURSE PRACTITIONER

## 2024-03-25 PROCEDURE — G8427 DOCREV CUR MEDS BY ELIG CLIN: HCPCS | Performed by: NURSE PRACTITIONER

## 2024-03-25 PROCEDURE — G8419 CALC BMI OUT NRM PARAM NOF/U: HCPCS | Performed by: NURSE PRACTITIONER

## 2024-03-25 PROCEDURE — 81003 URINALYSIS AUTO W/O SCOPE: CPT | Performed by: NURSE PRACTITIONER

## 2024-03-25 PROCEDURE — 3017F COLORECTAL CA SCREEN DOC REV: CPT | Performed by: NURSE PRACTITIONER

## 2024-03-25 PROCEDURE — G8482 FLU IMMUNIZE ORDER/ADMIN: HCPCS | Performed by: NURSE PRACTITIONER

## 2024-03-25 PROCEDURE — 1036F TOBACCO NON-USER: CPT | Performed by: NURSE PRACTITIONER

## 2024-03-25 RX ORDER — CIPROFLOXACIN 500 MG/1
500 TABLET, FILM COATED ORAL 2 TIMES DAILY
Qty: 10 TABLET | Refills: 0 | Status: SHIPPED | OUTPATIENT
Start: 2024-03-25 | End: 2024-03-30

## 2024-03-25 ASSESSMENT — PATIENT HEALTH QUESTIONNAIRE - PHQ9
1. LITTLE INTEREST OR PLEASURE IN DOING THINGS: NOT AT ALL
SUM OF ALL RESPONSES TO PHQ QUESTIONS 1-9: 0
SUM OF ALL RESPONSES TO PHQ QUESTIONS 1-9: 0
2. FEELING DOWN, DEPRESSED OR HOPELESS: NOT AT ALL
SUM OF ALL RESPONSES TO PHQ QUESTIONS 1-9: 0
SUM OF ALL RESPONSES TO PHQ QUESTIONS 1-9: 0
SUM OF ALL RESPONSES TO PHQ9 QUESTIONS 1 & 2: 0

## 2024-03-25 ASSESSMENT — ENCOUNTER SYMPTOMS
ABDOMINAL PAIN: 0
CHEST TIGHTNESS: 0
EYE PAIN: 0
NAUSEA: 0
SHORTNESS OF BREATH: 0
SORE THROAT: 0
COUGH: 0
VOMITING: 0

## 2024-03-25 NOTE — PROGRESS NOTES
[facility] PROFESSIONAL [facility]  [address]  [facility] [address]  Dept: [phone]  Loc: [phone]     Aida Ghosh (:  1970) is a 54 y.o. female, here for evaluation of the following chief complaint(s):  Nephrolithiasis (Started 2 weeks ago.  Flank pain on left side.  Started in lower back and now radiating to the front.  Pt stated she thinks she had a fever last night.  Small clot when urinating two different times.  Feeling pressure after urination.  No burning.  Urine is cloudy here and there. )      ASSESSMENT/PLAN:  1. Acute cystitis with hematuria  -     CT ABDOMEN PELVIS WO CONTRAST Additional Contrast? None; Future  -     Culture, Urine  -     ciprofloxacin (CIPRO) 500 MG tablet; Take 1 tablet by mouth 2 times daily for 5 days, Disp-10 tablet, R-0Normal  2. Kidney stone  -     POCT Urinalysis No Micro (Auto)  -     CT ABDOMEN PELVIS WO CONTRAST Additional Contrast? None; Future  -     Culture, Urine    Will start antibiotic for UTI. Will obtain CT with blood in urine, flank pain and previous history of kidney stone. Advised of red flag symptoms and when to go to ER.    Return for Regular follow up as scheduled and as needed.    SUBJECTIVE/OBJECTIVE:  Patient states she has been having pain on her left side for about 2 weeks. Has history of kidney stone previously. States wakes up at night sweating. Has seen small blood clot when urinating and urine has been cloudy. States she feels pressure after she finishes urinating. No frequency or urgency. No N/V.         Review of Systems   Constitutional:  Negative for chills and fever.   HENT:  Negative for congestion and sore throat.    Eyes:  Negative for pain and visual disturbance.   Respiratory:  Negative for cough, chest tightness and shortness of breath.    Cardiovascular:  Negative for chest pain and palpitations.   Gastrointestinal:  Negative for abdominal pain, nausea and vomiting.

## 2024-03-28 ENCOUNTER — TELEPHONE (OUTPATIENT)
Dept: FAMILY MEDICINE CLINIC | Age: 54
End: 2024-03-28

## 2024-03-28 DIAGNOSIS — N30.01 ACUTE CYSTITIS WITH HEMATURIA: Primary | ICD-10-CM

## 2024-03-28 LAB
BACTERIA UR CULT: ABNORMAL
ORGANISM: ABNORMAL

## 2024-03-28 RX ORDER — NITROFURANTOIN 25; 75 MG/1; MG/1
100 CAPSULE ORAL 2 TIMES DAILY
Qty: 20 CAPSULE | Refills: 0 | Status: SHIPPED | OUTPATIENT
Start: 2024-03-28 | End: 2024-04-07

## 2024-03-28 NOTE — TELEPHONE ENCOUNTER
----- Message from PASTOR Carcamo - CNP sent at 3/28/2024  9:22 AM EDT -----  Urine culture grew E Coli which is resistant to the antibiotic I sent in. I am going to call in a prescription for nitrofurantoin. Has she left town yet? Should I call it in locally?

## 2024-03-28 NOTE — TELEPHONE ENCOUNTER
Spoke with patient she is out of [place] please send nitrofurantoin to [facility] [address] [facility] [place].

## 2024-05-06 LAB
ALBUMIN SERPL-MCNC: 4.5 G/DL
ALP BLD-CCNC: 65 U/L
ALT SERPL-CCNC: 21 U/L
ANION GAP SERPL CALCULATED.3IONS-SCNC: NORMAL MMOL/L
AST SERPL-CCNC: 35 U/L
BASOPHILS ABSOLUTE: NORMAL
BASOPHILS RELATIVE PERCENT: 0.4 %
BILIRUB SERPL-MCNC: 0.7 MG/DL (ref 0.1–1.4)
BILIRUBIN DIRECT: 0.1 MG/DL
BUN BLDV-MCNC: 23 MG/DL
CALCIUM SERPL-MCNC: 9.8 MG/DL
CHLORIDE BLD-SCNC: 101 MMOL/L
CHOLESTEROL, TOTAL: 176 MG/DL
CHOLESTEROL/HDL RATIO: NORMAL
CO2: 27 MMOL/L
CREAT SERPL-MCNC: 0.7 MG/DL
EGFR: NORMAL
EOSINOPHILS ABSOLUTE: 0.1 /ΜL
EOSINOPHILS RELATIVE PERCENT: 2 %
GLUCOSE BLD-MCNC: 53 MG/DL
HCT VFR BLD CALC: 39.6 % (ref 36–46)
HDLC SERPL-MCNC: 54 MG/DL (ref 35–70)
HEMOGLOBIN: 12.9 G/DL (ref 12–16)
LDL CHOLESTEROL CALCULATED: 107 MG/DL (ref 0–160)
LYMPHOCYTES ABSOLUTE: 1.4 /ΜL
LYMPHOCYTES RELATIVE PERCENT: 27.8 %
MCH RBC QN AUTO: 30.4 PG
MCHC RBC AUTO-ENTMCNC: 32.5 G/DL
MCV RBC AUTO: 93.7 FL
MONOCYTES ABSOLUTE: 0.4 /ΜL
MONOCYTES RELATIVE PERCENT: 9.2 %
NEUTROPHILS ABSOLUTE: 2.9 /ΜL
NEUTROPHILS RELATIVE PERCENT: 60.6 %
NONHDLC SERPL-MCNC: NORMAL MG/DL
PHOSPHORUS: 3.6 MG/DL
PLATELET # BLD: 298 K/ΜL
PMV BLD AUTO: 6.9 FL
POTASSIUM SERPL-SCNC: 4.6 MMOL/L
RBC # BLD: 4.23 10^6/ΜL
SODIUM BLD-SCNC: 140 MMOL/L
TOTAL PROTEIN: 7.7
TRIGL SERPL-MCNC: 73 MG/DL
VLDLC SERPL CALC-MCNC: 15 MG/DL
WBC # BLD: 4.9 10^3/ML

## 2024-05-28 ENCOUNTER — HOSPITAL ENCOUNTER (OUTPATIENT)
Age: 54
Discharge: HOME | End: 2024-05-28
Payer: COMMERCIAL

## 2024-05-28 ENCOUNTER — OFFICE VISIT (OUTPATIENT)
Dept: FAMILY MEDICINE CLINIC | Age: 54
End: 2024-05-28
Payer: COMMERCIAL

## 2024-05-28 VITALS
OXYGEN SATURATION: 98 % | RESPIRATION RATE: 18 BRPM | TEMPERATURE: 97.2 F | DIASTOLIC BLOOD PRESSURE: 78 MMHG | HEART RATE: 70 BPM | SYSTOLIC BLOOD PRESSURE: 118 MMHG | BODY MASS INDEX: 16.46 KG/M2 | WEIGHT: 115 LBS | HEIGHT: 70 IN

## 2024-05-28 DIAGNOSIS — F43.9 SITUATIONAL STRESS: ICD-10-CM

## 2024-05-28 DIAGNOSIS — Z12.4 SCREENING FOR CERVICAL CANCER: ICD-10-CM

## 2024-05-28 DIAGNOSIS — Z00.00 ANNUAL PHYSICAL EXAM: ICD-10-CM

## 2024-05-28 DIAGNOSIS — R63.6 UNDERWEIGHT: ICD-10-CM

## 2024-05-28 DIAGNOSIS — F90.0 ATTENTION DEFICIT HYPERACTIVITY DISORDER (ADHD), PREDOMINANTLY INATTENTIVE TYPE: ICD-10-CM

## 2024-05-28 DIAGNOSIS — R00.0 TACHYCARDIA: ICD-10-CM

## 2024-05-28 DIAGNOSIS — R00.2 PALPITATIONS: ICD-10-CM

## 2024-05-28 DIAGNOSIS — R63.4 WEIGHT LOSS: ICD-10-CM

## 2024-05-28 DIAGNOSIS — Z00.00 ANNUAL PHYSICAL EXAM: Primary | ICD-10-CM

## 2024-05-28 LAB
T4 FREE SERPL-MCNC: 1.2 NG/DL (ref 0.93–1.68)
TSH SERPL DL<=0.005 MIU/L-ACNC: 0.32 UIU/ML (ref 0.4–4.2)

## 2024-05-28 PROCEDURE — 84439 ASSAY OF FREE THYROXINE: CPT

## 2024-05-28 PROCEDURE — 36415 COLL VENOUS BLD VENIPUNCTURE: CPT

## 2024-05-28 PROCEDURE — 84443 ASSAY THYROID STIM HORMONE: CPT

## 2024-05-28 PROCEDURE — 99396 PREV VISIT EST AGE 40-64: CPT | Performed by: FAMILY MEDICINE

## 2024-05-28 RX ORDER — ESCITALOPRAM OXALATE 10 MG/1
10 TABLET ORAL DAILY
Qty: 90 TABLET | Refills: 3 | Status: SHIPPED | OUTPATIENT
Start: 2024-05-28

## 2024-05-28 RX ORDER — ESCITALOPRAM OXALATE 10 MG/1
10 TABLET ORAL DAILY
Qty: 90 TABLET | Refills: 3 | Status: SHIPPED | OUTPATIENT
Start: 2024-05-28 | End: 2024-05-28

## 2024-05-28 RX ORDER — ATENOLOL 25 MG/1
25 TABLET ORAL DAILY PRN
Qty: 30 TABLET | Refills: 5 | Status: SHIPPED | OUTPATIENT
Start: 2024-05-28

## 2024-05-28 RX ORDER — ATOMOXETINE 80 MG/1
80 CAPSULE ORAL DAILY
Qty: 90 CAPSULE | Refills: 3 | Status: SHIPPED | OUTPATIENT
Start: 2024-05-28

## 2024-05-28 RX ORDER — ATOMOXETINE 80 MG/1
80 CAPSULE ORAL DAILY
Qty: 90 CAPSULE | Refills: 3 | Status: SHIPPED | OUTPATIENT
Start: 2024-05-28 | End: 2024-05-28

## 2024-05-28 SDOH — ECONOMIC STABILITY: FOOD INSECURITY: WITHIN THE PAST 12 MONTHS, THE FOOD YOU BOUGHT JUST DIDN'T LAST AND YOU DIDN'T HAVE MONEY TO GET MORE.: NEVER TRUE

## 2024-05-28 SDOH — ECONOMIC STABILITY: FOOD INSECURITY: WITHIN THE PAST 12 MONTHS, YOU WORRIED THAT YOUR FOOD WOULD RUN OUT BEFORE YOU GOT MONEY TO BUY MORE.: NEVER TRUE

## 2024-05-28 SDOH — ECONOMIC STABILITY: INCOME INSECURITY: HOW HARD IS IT FOR YOU TO PAY FOR THE VERY BASICS LIKE FOOD, HOUSING, MEDICAL CARE, AND HEATING?: NOT HARD AT ALL

## 2024-05-28 ASSESSMENT — ENCOUNTER SYMPTOMS
VOMITING: 0
SHORTNESS OF BREATH: 0
ABDOMINAL PAIN: 0
DIARRHEA: 0
NAUSEA: 0

## 2024-05-28 NOTE — PROGRESS NOTES
Attending Physician Statement  I have discussed the case, including pertinent history and exam findings with the resident. I also have seen the patient and performed key portions of the examination. I agree with the documented assessment and plan as documented by the resident.      Aida Ghosh (:  1970) is a 54 y.o. female,Established patient, here for evaluation of the following chief complaint(s):  Annual Exam (Patient would like her thyroid checked. She states her heart will race here and there.)      Assessment & Plan   ASSESSMENT/PLAN:  1. Annual physical exam  -     TSH with Reflex; Future  2. Tachycardia  -     TSH with Reflex; Future  3. Situational stress  -     escitalopram (LEXAPRO) 10 MG tablet; Take 1 tablet by mouth daily, Disp-90 tablet, R-3Normal  4. Screening for cervical cancer  -     PAP SMEAR  5. Weight loss  -     TSH With Reflex Ft4; Future  -     Thyroid Antibodies; Future  6. Underweight  -     TSH With Reflex Ft4; Future  -     Thyroid Antibodies; Future  7. Attention deficit hyperactivity disorder (ADHD), predominantly inattentive type  -     atomoxetine (STRATTERA) 80 MG capsule; Take 1 capsule by mouth daily, Disp-90 capsule, R-3Normal  8. Palpitations  -     atenolol (TENORMIN) 25 MG tablet; Take 1 tablet by mouth daily as needed (heart), Disp-30 tablet, R-5Normal  -     TSH With Reflex Ft4; Future  -     Thyroid Antibodies; Future    Patient overall feeling well weight loss is noted despite our efforts to increase calories and gain some weight.  Thyroid repeat shows slight suppression.  Free T4 is pending.  Will reorder TSH brain antibodies to be done in 1 month.  Patient is minimally symptomatic in terms of heart symptoms.  She will continue to monitor weight and monitor for other symptoms.  remeron was discussed as an appetite and weight help if needed.  Beta-blockade if needed  Patient will consistently which could potentially protect weight.  Monitor closely.  Will

## 2024-05-28 NOTE — PROGRESS NOTES
[facility] PROFESSIONAL [facility] MEDICINE  [address]  [facility] [address]  Dept: [phone]  Dept Fax: [phone]  Loc: [phone]      Aida Ghosh is a 54 y.o. female who presents today for:  Chief Complaint   Patient presents with    Annual Exam     Patient would like her thyroid checked. She states her heart will race here and there.       HPI:   Aida Ghosh is 54 y.o. presents today for annual physical.   Patient states she is well today, has no concerns.  Patient is wondering if she should get a ABUS since she has breast tissue that was noted on her last mammo that was completed December 22, 2023.  Discussed what ABUS is and reviewed last mammo with patient.   Labs obtained 5/6/2024 reviewed with patient.   Patient states she gets episodic episodes of tachycardia maybe about once a month with associated pressure-like feelings in the chest that can last up to 15 minutes.  With no associated shortness of breath, nonradiating, relieved with atenolol.  Patient states that she has these episodes randomly when she is sitting down or working on the garden, not exertional in nature.  Patient states that she followed up with Dr. Ahn regarding EGD and all biopsies were normal and she was told to follow-up for repeat colonoscopy in 10 years from last colonoscopy (2022).     Objective:     Vitals:    05/28/24 0915   BP: 118/78   Pulse:    Resp:    Temp:    SpO2:          Wt Readings from Last 3 Encounters:   05/28/24 52.2 kg (115 lb)   03/25/24 54.9 kg (121 lb)   12/05/23 49.9 kg (110 lb)       BP Readings from Last 3 Encounters:   05/28/24 118/78   03/25/24 90/60   12/05/23 (!) 144/69       Lab Results   Component Value Date    WBC 4.9 05/06/2024    HGB 12.9 05/06/2024    HCT 39.6 05/06/2024    MCV 93.7 05/06/2024     05/06/2024     Lab Results   Component Value Date     05/06/2024    K 4.6 05/06/2024     05/06/2024    CO2 27 05/06/2024

## 2024-06-06 ENCOUNTER — TELEPHONE (OUTPATIENT)
Dept: FAMILY MEDICINE CLINIC | Age: 54
End: 2024-06-06

## 2024-06-06 DIAGNOSIS — R87.618 ABNORMAL PAPANICOLAOU SMEAR OF CERVIX WITH POSITIVE HUMAN PAPILLOMA VIRUS (HPV) TEST: Primary | ICD-10-CM

## 2024-06-06 LAB — CYTOLOGY THIN PREP PAP: NORMAL

## 2024-06-06 NOTE — TELEPHONE ENCOUNTER
----- Message from PASTOR Carcamo - CNP sent at 6/6/2024 12:57 PM EDT -----  Pap smear is abnormal and HPV was positive. Next step is to see gynecology as she may need a colposcopy. Is she OK with a referral? Does she have anyone in particular she wants to see?

## 2024-06-06 NOTE — TELEPHONE ENCOUNTER
Patient was notified of the abnormal PAP and HPV positive results.     She would like to see Dr. Wagner. Referral has been pended.

## 2024-07-29 ENCOUNTER — LAB (OUTPATIENT)
Dept: LAB | Age: 54
End: 2024-07-29

## 2024-10-10 ENCOUNTER — HOSPITAL ENCOUNTER (EMERGENCY)
Age: 54
Discharge: HOME | End: 2024-10-10
Payer: COMMERCIAL

## 2024-10-10 ENCOUNTER — APPOINTMENT (OUTPATIENT)
Dept: GENERAL RADIOLOGY | Age: 54
End: 2024-10-10
Payer: COMMERCIAL

## 2024-10-10 VITALS
WEIGHT: 118 LBS | RESPIRATION RATE: 20 BRPM | TEMPERATURE: 97 F | SYSTOLIC BLOOD PRESSURE: 131 MMHG | OXYGEN SATURATION: 99 % | BODY MASS INDEX: 17.18 KG/M2 | HEART RATE: 86 BPM | DIASTOLIC BLOOD PRESSURE: 84 MMHG

## 2024-10-10 DIAGNOSIS — T14.8XXA MUSCULOSKELETAL STRAIN: Primary | ICD-10-CM

## 2024-10-10 LAB
EKG ATRIAL RATE: 86 BPM
EKG P-R INTERVAL: 136 MS
EKG Q-T INTERVAL: 388 MS
EKG QRS DURATION: 84 MS
EKG QTC CALCULATION (BAZETT): 464 MS
EKG R AXIS: 148 DEGREES
EKG T AXIS: 148 DEGREES
EKG VENTRICULAR RATE: 86 BPM

## 2024-10-10 PROCEDURE — 6370000000 HC RX 637 (ALT 250 FOR IP): Performed by: NURSE PRACTITIONER

## 2024-10-10 PROCEDURE — 99213 OFFICE O/P EST LOW 20 MIN: CPT | Performed by: NURSE PRACTITIONER

## 2024-10-10 PROCEDURE — 93005 ELECTROCARDIOGRAM TRACING: CPT | Performed by: NURSE PRACTITIONER

## 2024-10-10 PROCEDURE — 93010 ELECTROCARDIOGRAM REPORT: CPT | Performed by: INTERNAL MEDICINE

## 2024-10-10 PROCEDURE — 99214 OFFICE O/P EST MOD 30 MIN: CPT

## 2024-10-10 PROCEDURE — 71101 X-RAY EXAM UNILAT RIBS/CHEST: CPT

## 2024-10-10 RX ORDER — KETOROLAC TROMETHAMINE 10 MG/1
10 TABLET, FILM COATED ORAL EVERY 6 HOURS PRN
Qty: 20 TABLET | Refills: 0 | Status: SHIPPED | OUTPATIENT
Start: 2024-10-10

## 2024-10-10 RX ORDER — CYCLOBENZAPRINE HCL 5 MG
5 TABLET ORAL 3 TIMES DAILY PRN
Qty: 30 TABLET | Refills: 0 | Status: SHIPPED | OUTPATIENT
Start: 2024-10-10 | End: 2024-10-20

## 2024-10-10 RX ORDER — IBUPROFEN 200 MG
400 TABLET ORAL ONCE
Status: COMPLETED | OUTPATIENT
Start: 2024-10-10 | End: 2024-10-10

## 2024-10-10 RX ADMIN — IBUPROFEN 400 MG: 200 TABLET, FILM COATED ORAL at 13:30

## 2024-10-10 ASSESSMENT — PAIN DESCRIPTION - DESCRIPTORS: DESCRIPTORS: SHARP

## 2024-10-10 ASSESSMENT — PAIN DESCRIPTION - LOCATION: LOCATION: CHEST

## 2024-10-10 ASSESSMENT — PAIN - FUNCTIONAL ASSESSMENT: PAIN_FUNCTIONAL_ASSESSMENT: 0-10

## 2024-10-10 ASSESSMENT — PAIN DESCRIPTION - FREQUENCY: FREQUENCY: INTERMITTENT

## 2024-10-10 ASSESSMENT — PAIN DESCRIPTION - ORIENTATION: ORIENTATION: LEFT;LOWER

## 2024-10-10 ASSESSMENT — PAIN DESCRIPTION - PAIN TYPE: TYPE: ACUTE PAIN

## 2024-10-10 ASSESSMENT — PAIN SCALES - GENERAL: PAINLEVEL_OUTOF10: 5

## 2024-10-10 NOTE — DISCHARGE INSTRUCTIONS
Medications as prescribed.  Apply heat to the area.    Follow up with primary care provider as needed.      Report to ED with new or severe symptoms.

## 2024-10-10 NOTE — ED PROVIDER NOTES
[facility] CARE [place]  UrgentCare Encounter      CHIEFCOMPLAINT       Chief Complaint   Patient presents with    Chest Pain     Left, lower       Nurses Notes reviewed and I agree except as noted in the HPI.  HISTORY OF PRESENT ILLNESS   Aida Ghosh is a 54 y.o. female who presents to urgent care with complaints of pain to the left lower ribs.  Patient states that day before yesterday she was lifting the tailgate of their [id number] day before yesterday pain started yesterday.  Denies shortness of breath.  Denies nausea.  Denies chest pain.  Pain is reproducible with movement and palpation.    REVIEW OF SYSTEMS     Review of Systems   Cardiovascular:         Left lower chest wall pain.       PAST MEDICAL HISTORY   History reviewed. No pertinent past medical history.    SURGICAL HISTORY     Patient  has a past surgical history that includes Elbow fracture surgery and fracture surgery (Dec. 5, 2023).    CURRENT MEDICATIONS       Discharge Medication List as of 10/10/2024  1:50 PM        CONTINUE these medications which have NOT CHANGED    Details   atenolol (TENORMIN) 25 MG tablet Take 1 tablet by mouth daily as needed (heart), Disp-30 tablet, R-5Normal      escitalopram (LEXAPRO) 10 MG tablet Take 1 tablet by mouth daily, Disp-90 tablet, R-3Normal      atomoxetine (STRATTERA) 80 MG capsule Take 1 capsule by mouth daily, Disp-90 capsule, R-3Normal      COLLAGEN PO Take by mouthHistorical Med      Biotin 10 MG tablet Take 1 tablet by mouth dailyHistorical Med      ferrous sulfate (IRON 325) 325 (65 Fe) MG tablet Take 1 tablet by mouth daily (with breakfast)Historical Med      Cholecalciferol (VITAMIN D) 50 MCG (2000 UT) CAPS capsule Take by mouthHistorical Med      B Complex-Biotin-FA (SUPER B-50 COMPLEX) CAPS 1 tablet daily, Disp-100 capsule,R-3OTC             ALLERGIES     Patient is is allergic to neomycin.    FAMILY HISTORY     Patient'sfamily history includes Heart Disease in her father; High    Psychiatric:         Mood and Affect: Mood normal.         Behavior: Behavior normal.         Thought Content: Thought content normal.         Judgment: Judgment normal.         DIAGNOSTIC RESULTS   Labs:  Results for orders placed or performed during the hospital encounter of 10/10/24   EKG 12 Lead   Result Value Ref Range    Ventricular Rate 86 BPM    Atrial Rate 86 BPM    P-R Interval 136 ms    QRS Duration 84 ms    Q-T Interval 388 ms    QTc Calculation (Bazett) 464 ms    R Axis 148 degrees    T Axis 148 degrees       IMAGING:  XR RIBS LEFT INCLUDE CHEST (MIN 3 VIEWS)   Final Result   1. No acute intrathoracic process.   2. No left rib fracture.            **This report has been created using voice recognition software. It may contain   minor errors which are inherent in voice recognition technology.**         Electronically signed by Dr. Herbert Hernandez        URGENT CARE COURSE:         Medications   ibuprofen (ADVIL;MOTRIN) tablet 400 mg (400 mg Oral Given 10/10/24 1330)     PROCEDURES:  FINALIMPRESSION      1. Musculoskeletal strain        DISPOSITION/PLAN   DISPOSITION Decision To Discharge 10/10/2024 01:49:32 PM  Condition at Disposition: Data Unavailable    EKG shows no STEMI.  No acute ischemia.  Normal sinus rhythm.  Patient denies any recent long travel.  No hormone replacement.  No recent hospitalization.  No clotting disorders.  No history of PE or DVT.  Heart rate is 86.  SpO2 is 99% room air.  Findings consistent with a musculoskeletal chest wall strain.  Will plan to start on cyclobenzaprine and Toradol advised close follow-up with primary care provider.  She was advised report to the ER with any new or severe symptoms.    PATIENT REFERRED TO:  Taylor Wilson MD  [address] [id number]  [phone]          DISCHARGE MEDICATIONS:  Discharge Medication List as of 10/10/2024  1:50 PM        START taking these medications    Details   cyclobenzaprine (FLEXERIL) 5 MG tablet

## 2024-10-10 NOTE — ED TRIAGE NOTES
Patient to room from registration. C/o sharp, intermittent pain in left, anterior rib beginning three days ago after pushing an object. Increased pain beginning today. EKG completed.

## 2024-10-12 LAB
ALBUMIN: 4.4 G/DL
ALP BLD-CCNC: 70 U/L
ALT SERPL-CCNC: 14 U/L
ANION GAP SERPL CALCULATED.3IONS-SCNC: NORMAL MMOL/L
AST SERPL-CCNC: 22 U/L
BASOPHILS ABSOLUTE: NORMAL
BASOPHILS RELATIVE PERCENT: 0.9 %
BILIRUB SERPL-MCNC: 0.8 MG/DL (ref 0.1–1.4)
BILIRUBIN DIRECT: 0.1 MG/DL
BUN BLDV-MCNC: 15 MG/DL
CALCIUM SERPL-MCNC: 9.6 MG/DL
CHLORIDE BLD-SCNC: 101 MMOL/L
CHOLESTEROL, TOTAL: 209 MG/DL
CHOLESTEROL/HDL RATIO: NORMAL
CO2: 32 MMOL/L
CREAT SERPL-MCNC: 0.8 MG/DL
EOSINOPHILS ABSOLUTE: 0.2 /ΜL
EOSINOPHILS RELATIVE PERCENT: 3.3 %
GFR, ESTIMATED: 74
GLUCOSE BLD-MCNC: 92 MG/DL
HCT VFR BLD CALC: 38.5 % (ref 36–46)
HDLC SERPL-MCNC: 61 MG/DL (ref 35–70)
HEMOGLOBIN: 12.7 G/DL (ref 12–16)
LDL CHOLESTEROL: 128
LYMPHOCYTES ABSOLUTE: 1.6 /ΜL
LYMPHOCYTES RELATIVE PERCENT: 30.1 %
MCH RBC QN AUTO: 30.5 PG
MCHC RBC AUTO-ENTMCNC: 32.8 G/DL
MCV RBC AUTO: 92.8 FL
MONOCYTES ABSOLUTE: 0.4 /ΜL
MONOCYTES RELATIVE PERCENT: 8.2 %
NEUTROPHILS ABSOLUTE: 3 /ΜL
NEUTROPHILS RELATIVE PERCENT: 57.5 %
NONHDLC SERPL-MCNC: NORMAL MG/DL
PDW BLD-RTO: 13 %
PHOSPHORUS: 3.4 MG/DL
PLATELET # BLD: 260 K/ΜL
PMV BLD AUTO: 6.9 FL
POTASSIUM SERPL-SCNC: 4.3 MMOL/L
RBC # BLD: 4.15 10^6/ΜL
SODIUM BLD-SCNC: 141 MMOL/L
TOTAL PROTEIN: 7.5 G/DL (ref 6.4–8.2)
TRIGL SERPL-MCNC: 100 MG/DL
VLDLC SERPL CALC-MCNC: 20 MG/DL
WBC # BLD: 5.2 10^3/ML

## 2024-12-06 ENCOUNTER — OFFICE VISIT (OUTPATIENT)
Dept: FAMILY MEDICINE CLINIC | Age: 54
End: 2024-12-06
Payer: COMMERCIAL

## 2024-12-06 VITALS
HEART RATE: 84 BPM | SYSTOLIC BLOOD PRESSURE: 108 MMHG | WEIGHT: 119 LBS | OXYGEN SATURATION: 98 % | DIASTOLIC BLOOD PRESSURE: 62 MMHG | HEIGHT: 70 IN | BODY MASS INDEX: 17.04 KG/M2 | RESPIRATION RATE: 17 BRPM

## 2024-12-06 DIAGNOSIS — F43.9 SITUATIONAL STRESS: ICD-10-CM

## 2024-12-06 DIAGNOSIS — E05.90 HYPERTHYROIDISM, SUBCLINICAL: Chronic | ICD-10-CM

## 2024-12-06 DIAGNOSIS — R23.2 HOT FLASHES: ICD-10-CM

## 2024-12-06 DIAGNOSIS — N95.1 PERIMENOPAUSAL: Primary | ICD-10-CM

## 2024-12-06 DIAGNOSIS — G47.00 INSOMNIA, UNSPECIFIED TYPE: ICD-10-CM

## 2024-12-06 PROCEDURE — G8427 DOCREV CUR MEDS BY ELIG CLIN: HCPCS | Performed by: FAMILY MEDICINE

## 2024-12-06 PROCEDURE — 99214 OFFICE O/P EST MOD 30 MIN: CPT | Performed by: FAMILY MEDICINE

## 2024-12-06 PROCEDURE — G8484 FLU IMMUNIZE NO ADMIN: HCPCS | Performed by: FAMILY MEDICINE

## 2024-12-06 PROCEDURE — G8419 CALC BMI OUT NRM PARAM NOF/U: HCPCS | Performed by: FAMILY MEDICINE

## 2024-12-06 PROCEDURE — 3017F COLORECTAL CA SCREEN DOC REV: CPT | Performed by: FAMILY MEDICINE

## 2024-12-06 PROCEDURE — 1036F TOBACCO NON-USER: CPT | Performed by: FAMILY MEDICINE

## 2024-12-06 RX ORDER — ESCITALOPRAM OXALATE 10 MG/1
10 TABLET ORAL DAILY
Qty: 90 TABLET | Refills: 3 | Status: SHIPPED | OUTPATIENT
Start: 2024-12-06

## 2024-12-06 ASSESSMENT — ENCOUNTER SYMPTOMS: SHORTNESS OF BREATH: 0

## 2024-12-06 NOTE — PATIENT INSTRUCTIONS
Black cohosh up to 150 mg per day  Okay to take with magnesium and melatonin  Estroven is an over the counter option    Check out collagen supplements

## 2024-12-06 NOTE — PROGRESS NOTES
Aida Ghosh (:  1970) is a 54 y.o. female,Established patient, here for evaluation of the following chief complaint(s):  Other (Feels that she is going through Perimenopause. Having brain fog, tired, and emotional.//Pain in left elbow. Saw previous surgeon and he said everything was fine. //Would like thyroid levels checked.)      Assessment & Plan   ASSESSMENT/PLAN:  1. Perimenopausal  2. Hot flashes  3. Insomnia, unspecified type  4. Hyperthyroidism, subclinical  5. Situational stress  -     escitalopram (LEXAPRO) 10 MG tablet; Take 1 tablet by mouth daily, Disp-90 tablet, R-3Normal    We discussed approaches to some of the symptoms and we will focus on improving sleep.  Combination of black cohosh with magnesium plus or minus melatonin is where we will start.  Patient will continue on Lexapro 10 mg but we discussed increasing it if needed.  Other options are possible including trazodone  We discussed HRT as an option   Encourage patient to continue healthy exercise and diet with adequate protein and healthy carbohydrates.    Return in about 6 months (around 2025).         Subjective   SUBJECTIVE/OBJECTIVE:  HPI    History of Present Illness  The patient is a 54-year-old female who presents for evaluation of perimenopausal concerns.    She has been experiencing changes in her menstrual cycle over the past few months, with her last period occurring around 09/15/2024 and the one before that in 2024. She reports sudden hot flashes, recent vaginal dryness, and intermittent bladder issues. Additionally, she has noticed some cognitive difficulties, such as forgetting names and tasks. Her sleep pattern has also been disrupted, with frequent awakenings and difficulty falling back asleep, resulting in only 3 to 4 hours of sleep per night. Despite being sexually active, she does not engage in frequent intercourse.    She experienced a sudden onset of chest pain and shortness of breath a few days ago,

## 2025-01-03 ENCOUNTER — HOSPITAL ENCOUNTER (OUTPATIENT)
Dept: MAMMOGRAPHY | Age: 55
Discharge: HOME | End: 2025-01-03
Payer: COMMERCIAL

## 2025-01-03 DIAGNOSIS — Z12.39 SCREENING BREAST EXAMINATION: ICD-10-CM

## 2025-01-03 DIAGNOSIS — R63.6 UNDERWEIGHT: ICD-10-CM

## 2025-01-03 DIAGNOSIS — R00.2 PALPITATIONS: ICD-10-CM

## 2025-01-03 DIAGNOSIS — R63.4 WEIGHT LOSS: ICD-10-CM

## 2025-01-03 LAB — TSH SERPL DL<=0.005 MIU/L-ACNC: 0.42 UIU/ML (ref 0.4–4.2)

## 2025-01-03 PROCEDURE — 86800 THYROGLOBULIN ANTIBODY: CPT

## 2025-01-03 PROCEDURE — 84443 ASSAY THYROID STIM HORMONE: CPT

## 2025-01-03 PROCEDURE — 77063 BREAST TOMOSYNTHESIS BI: CPT

## 2025-01-03 PROCEDURE — 86376 MICROSOMAL ANTIBODY EACH: CPT

## 2025-01-03 PROCEDURE — 36415 COLL VENOUS BLD VENIPUNCTURE: CPT

## 2025-01-05 LAB
THYROGLOB AB SERPL-ACNC: < 0.9 IU/ML (ref 0–4)
THYROPEROXIDASE AB SERPL-ACNC: 0.4 IU/ML (ref 0–9)

## 2025-06-06 ENCOUNTER — OFFICE VISIT (OUTPATIENT)
Dept: FAMILY MEDICINE CLINIC | Age: 55
End: 2025-06-06
Payer: COMMERCIAL

## 2025-06-06 VITALS
BODY MASS INDEX: 16.86 KG/M2 | DIASTOLIC BLOOD PRESSURE: 68 MMHG | HEART RATE: 88 BPM | SYSTOLIC BLOOD PRESSURE: 104 MMHG | OXYGEN SATURATION: 99 % | WEIGHT: 115.8 LBS

## 2025-06-06 DIAGNOSIS — E05.90 HYPERTHYROIDISM, SUBCLINICAL: Chronic | ICD-10-CM

## 2025-06-06 DIAGNOSIS — R63.6 UNDERWEIGHT: ICD-10-CM

## 2025-06-06 DIAGNOSIS — R00.2 PALPITATIONS: Primary | ICD-10-CM

## 2025-06-06 DIAGNOSIS — F90.0 ATTENTION DEFICIT HYPERACTIVITY DISORDER (ADHD), PREDOMINANTLY INATTENTIVE TYPE: ICD-10-CM

## 2025-06-06 DIAGNOSIS — F43.9 SITUATIONAL STRESS: ICD-10-CM

## 2025-06-06 DIAGNOSIS — F98.8 ATTENTION DEFICIT DISORDER (ADD) WITHOUT HYPERACTIVITY: ICD-10-CM

## 2025-06-06 DIAGNOSIS — Z00.00 HEALTHCARE MAINTENANCE: ICD-10-CM

## 2025-06-06 PROCEDURE — G8419 CALC BMI OUT NRM PARAM NOF/U: HCPCS | Performed by: FAMILY MEDICINE

## 2025-06-06 PROCEDURE — G8427 DOCREV CUR MEDS BY ELIG CLIN: HCPCS | Performed by: FAMILY MEDICINE

## 2025-06-06 PROCEDURE — 3017F COLORECTAL CA SCREEN DOC REV: CPT | Performed by: FAMILY MEDICINE

## 2025-06-06 PROCEDURE — 99214 OFFICE O/P EST MOD 30 MIN: CPT | Performed by: FAMILY MEDICINE

## 2025-06-06 PROCEDURE — 1036F TOBACCO NON-USER: CPT | Performed by: FAMILY MEDICINE

## 2025-06-06 RX ORDER — ESCITALOPRAM OXALATE 10 MG/1
10 TABLET ORAL DAILY
Qty: 90 TABLET | Refills: 3 | Status: SHIPPED | OUTPATIENT
Start: 2025-06-06

## 2025-06-06 RX ORDER — ATENOLOL 25 MG/1
25 TABLET ORAL DAILY PRN
Qty: 30 TABLET | Refills: 5 | Status: CANCELLED | OUTPATIENT
Start: 2025-06-06

## 2025-06-06 RX ORDER — ATOMOXETINE 100 MG/1
100 CAPSULE ORAL DAILY
Qty: 90 CAPSULE | Refills: 3 | Status: SHIPPED | OUTPATIENT
Start: 2025-06-06

## 2025-06-06 RX ORDER — ATOMOXETINE 80 MG/1
80 CAPSULE ORAL DAILY
Qty: 90 CAPSULE | Refills: 3 | Status: CANCELLED | OUTPATIENT
Start: 2025-06-06

## 2025-06-06 SDOH — ECONOMIC STABILITY: FOOD INSECURITY: WITHIN THE PAST 12 MONTHS, YOU WORRIED THAT YOUR FOOD WOULD RUN OUT BEFORE YOU GOT MONEY TO BUY MORE.: NEVER TRUE

## 2025-06-06 SDOH — ECONOMIC STABILITY: FOOD INSECURITY: WITHIN THE PAST 12 MONTHS, THE FOOD YOU BOUGHT JUST DIDN'T LAST AND YOU DIDN'T HAVE MONEY TO GET MORE.: NEVER TRUE

## 2025-06-06 ASSESSMENT — PATIENT HEALTH QUESTIONNAIRE - PHQ9
SUM OF ALL RESPONSES TO PHQ QUESTIONS 1-9: 0
1. LITTLE INTEREST OR PLEASURE IN DOING THINGS: NOT AT ALL
2. FEELING DOWN, DEPRESSED OR HOPELESS: NOT AT ALL
SUM OF ALL RESPONSES TO PHQ QUESTIONS 1-9: 0

## 2025-06-06 ASSESSMENT — ENCOUNTER SYMPTOMS: SHORTNESS OF BREATH: 0

## 2025-06-06 NOTE — PROGRESS NOTES
Aida Ghosh (:  1970) is a 55 y.o. female,Established patient, here for evaluation of the following chief complaint(s):  6 Month Follow-Up (Pt states she has intermitten dizziness. Pt does not want streterra renewed, pt states it does not work. )      Assessment & Plan   ASSESSMENT/PLAN:  1. Palpitations  2. Situational stress  -     escitalopram (LEXAPRO) 10 MG tablet; Take 1 tablet by mouth daily, Disp-90 tablet, R-3Normal  3. Underweight  4. Attention deficit disorder (ADD) without hyperactivity  5. Hyperthyroidism, subclinical  6. Attention deficit hyperactivity disorder (ADHD), predominantly inattentive type  -     atomoxetine (STRATTERA) 100 MG capsule; Take 1 capsule by mouth daily, Disp-90 capsule, R-3Normal  7. Healthcare maintenance  -     Comprehensive Metabolic Panel; Future  -     CBC with Auto Differential; Future  -     Lipid Panel; Future  -     TSH reflex to FT4; Future    Assessment & Plan  1. Menopause.  She has experienced weight loss, with a decrease from 119 pounds in December to 115 pounds currently. Her thyroid function has shown improvement since January. She was advised to incorporate an additional 10 grams of protein into her daily diet and aim for a minimum of 6 hours of sleep per night. The importance of maintaining hydration and electrolyte balance was emphasized. She was encouraged to continue her cardio drumming exercises and to increase her intake of dairy products for calcium and vitamin D. A bone scan will be considered in the future. She was also advised to take a separate vitamin D supplement at a dosage of 1000 to 2000 IU daily. For her dry skin, she was recommended to ensure adequate hydration, consume healthy fats, and increase her intake of vitamin C. She was also advised to increase her intake of fruits and vegetables, nuts, and fish oil. If her hot flashes are not well controlled, she can increase her black cohosh intake up to a total of 150 mg per

## 2025-06-06 NOTE — PATIENT INSTRUCTIONS
Add 10+ protein grams to your day    Aim for at least 6+ hours of sleep    Vitamin D3 1000 units daily

## 2025-06-30 ENCOUNTER — PATIENT MESSAGE (OUTPATIENT)
Dept: FAMILY MEDICINE CLINIC | Age: 55
End: 2025-06-30

## 2025-06-30 RX ORDER — ACYCLOVIR 800 MG/1
800 TABLET ORAL 3 TIMES DAILY
Qty: 15 TABLET | Refills: 2 | Status: SHIPPED | OUTPATIENT
Start: 2025-06-30

## 2025-06-30 RX ORDER — ACYCLOVIR 800 MG/1
800 TABLET ORAL 3 TIMES DAILY
Qty: 15 TABLET | Refills: 0 | Status: SHIPPED | OUTPATIENT
Start: 2025-06-30 | End: 2025-06-30

## 2025-06-30 NOTE — TELEPHONE ENCOUNTER
Aida Ghosh called requesting a refill on the following medications:  Requested Prescriptions     Pending Prescriptions Disp Refills    acyclovir (ZOVIRAX) 800 MG tablet 15 tablet 0     Sig: Take 1 tablet by mouth 3 times daily for 5 days       Date of last visit: 6/6/2025  Date of next visit (if applicable):12/12/2025  Date of last refill: 7/2/24  Pharmacy Name: [facility] LPN     From: Chet Mitchell  To: Delfin Perez gina much

## 2025-08-08 ENCOUNTER — PATIENT MESSAGE (OUTPATIENT)
Dept: FAMILY MEDICINE CLINIC | Age: 55
End: 2025-08-08

## 2025-08-08 DIAGNOSIS — F43.9 SITUATIONAL STRESS: ICD-10-CM

## 2025-08-11 RX ORDER — ESCITALOPRAM OXALATE 20 MG/1
20 TABLET ORAL DAILY
Qty: 90 TABLET | Refills: 1 | Status: SHIPPED | OUTPATIENT
Start: 2025-08-11